# Patient Record
Sex: FEMALE | Race: WHITE | NOT HISPANIC OR LATINO | Employment: FULL TIME | URBAN - METROPOLITAN AREA
[De-identification: names, ages, dates, MRNs, and addresses within clinical notes are randomized per-mention and may not be internally consistent; named-entity substitution may affect disease eponyms.]

---

## 2022-11-01 ENCOUNTER — PRE VISIT (OUTPATIENT)
Dept: ONCOLOGY | Facility: CLINIC | Age: 57
End: 2022-11-01

## 2022-11-01 ENCOUNTER — TRANSCRIBE ORDERS (OUTPATIENT)
Dept: ONCOLOGY | Facility: CLINIC | Age: 57
End: 2022-11-01

## 2022-11-01 ENCOUNTER — TRANSFERRED RECORDS (OUTPATIENT)
Dept: HEALTH INFORMATION MANAGEMENT | Facility: CLINIC | Age: 57
End: 2022-11-01

## 2022-11-01 ENCOUNTER — PATIENT OUTREACH (OUTPATIENT)
Dept: ONCOLOGY | Facility: CLINIC | Age: 57
End: 2022-11-01

## 2022-11-01 DIAGNOSIS — C69.30 CHOROIDAL MALIGNANT MELANOMA (H): Primary | ICD-10-CM

## 2022-11-01 DIAGNOSIS — C69.30 MALIGNANT MELANOMA OF CHOROID, UNSPECIFIED LATERALITY (H): Primary | ICD-10-CM

## 2022-11-01 LAB — RETINOPATHY: NEGATIVE

## 2022-11-01 NOTE — PROGRESS NOTES
New Patient Oncology Nurse Navigator Note     Referring provider: Dr. Irwin Monroy     Referring Clinic/Organization: [facility]  [facility]     Referred to (specialty): Medical Oncology    Requested provider (if applicable): Dr. 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 Irwin Monroy's office note dated  11/1/2022.     Clinical Assessment / Barriers to Care (Per Nurse): none noted     Records Location (Care Everywhere, Media, etc.): Epic, CE (     Records Needed: none     Additional testing needed prior to consult:   CT c/a/p and Labs (cbc w/diff & CMP):  scheduled at

## 2022-11-01 NOTE — PROGRESS NOTES
RECORDS STATUS - ALL OTHER DIAGNOSIS    Choroidal malignant melanoma (H)   RECORDS RECEIVED FROM: [facility]/ [facility] [phone]     DATE RECEIVED: 12/12/2022    Action    Action Taken 11/1/2022 3:26pm SAVANNA   I called [facility] [phone]- they will fax records over.     11/2/2022 11:18AM SAVANNA   I faxed records from [facility] to [facility] and emailed recs to the nurse nati team.     I called pt Cheyenne - unavailable. I tried calling again - no luck.       NOTES STATUS DETAILS   OFFICE NOTE from referring provider Complete Irwin Mehta MD   OFFICE NOTE from medical oncologist Complete I faxed records from [facility] to [facility] on 11/2/2022 11:23AM SAVANNA   LABS     PATHOLOGY REPORTS     ANYTHING RELATED TO DIAGNOSIS Complete Labs last updated on 11/1/2022   GENONOMIC TESTING     TYPE:     IMAGING (NEED IMAGES & REPORT)     CT SCANS Complete CT Chest Abdomen Pelvis 11/14/2022   MRI     MAMMO     ULTRASOUND     PET

## 2022-11-14 ENCOUNTER — LAB (OUTPATIENT)
Dept: LAB | Facility: CLINIC | Age: 57
End: 2022-11-14
Attending: INTERNAL MEDICINE
Payer: COMMERCIAL

## 2022-11-14 ENCOUNTER — HOSPITAL ENCOUNTER (OUTPATIENT)
Dept: CT IMAGING | Facility: CLINIC | Age: 57
Discharge: HOME | End: 2022-11-14
Attending: INTERNAL MEDICINE
Payer: COMMERCIAL

## 2022-11-14 DIAGNOSIS — C69.30 MALIGNANT MELANOMA OF CHOROID, UNSPECIFIED LATERALITY (H): ICD-10-CM

## 2022-11-14 LAB
ALBUMIN SERPL-MCNC: 3.8 G/DL (ref 3.4–5)
ALP SERPL-CCNC: 120 U/L (ref 40–150)
ALT SERPL W P-5'-P-CCNC: 57 U/L (ref 0–50)
ANION GAP SERPL CALCULATED.3IONS-SCNC: 8 MMOL/L (ref 3–14)
AST SERPL W P-5'-P-CCNC: 22 U/L (ref 0–45)
BASOPHILS # BLD AUTO: 0 10E3/UL (ref 0–0.2)
BASOPHILS NFR BLD AUTO: 0 %
BILIRUB SERPL-MCNC: 0.5 MG/DL (ref 0.2–1.3)
BUN SERPL-MCNC: 18 MG/DL (ref 7–30)
CALCIUM SERPL-MCNC: 9.1 MG/DL (ref 8.5–10.1)
CHLORIDE BLD-SCNC: 100 MMOL/L (ref 94–109)
CO2 SERPL-SCNC: 24 MMOL/L (ref 20–32)
CREAT SERPL-MCNC: 0.66 MG/DL (ref 0.52–1.04)
EOSINOPHIL # BLD AUTO: 0.1 10E3/UL (ref 0–0.7)
EOSINOPHIL NFR BLD AUTO: 1 %
ERYTHROCYTE [DISTWIDTH] IN BLOOD BY AUTOMATED COUNT: 11.9 % (ref 10–15)
GFR SERPL CREATININE-BSD FRML MDRD: >90 ML/MIN/1.73M2
GLUCOSE BLD-MCNC: 197 MG/DL (ref 70–99)
HCT VFR BLD AUTO: 39.4 % (ref 35–47)
HGB BLD-MCNC: 13.8 G/DL (ref 11.7–15.7)
IMM GRANULOCYTES # BLD: 0 10E3/UL
IMM GRANULOCYTES NFR BLD: 0 %
LYMPHOCYTES # BLD AUTO: 2.5 10E3/UL (ref 0.8–5.3)
LYMPHOCYTES NFR BLD AUTO: 38 %
MCH RBC QN AUTO: 29.2 PG (ref 26.5–33)
MCHC RBC AUTO-ENTMCNC: 35 G/DL (ref 31.5–36.5)
MCV RBC AUTO: 83 FL (ref 78–100)
MONOCYTES # BLD AUTO: 0.4 10E3/UL (ref 0–1.3)
MONOCYTES NFR BLD AUTO: 6 %
NEUTROPHILS # BLD AUTO: 3.6 10E3/UL (ref 1.6–8.3)
NEUTROPHILS NFR BLD AUTO: 55 %
NRBC # BLD AUTO: 0 10E3/UL
NRBC BLD AUTO-RTO: 0 /100
PLATELET # BLD AUTO: 236 10E3/UL (ref 150–450)
POTASSIUM BLD-SCNC: 4.3 MMOL/L (ref 3.4–5.3)
PROT SERPL-MCNC: 7.5 G/DL (ref 6.8–8.8)
RBC # BLD AUTO: 4.73 10E6/UL (ref 3.8–5.2)
SODIUM SERPL-SCNC: 132 MMOL/L (ref 133–144)
WBC # BLD AUTO: 6.7 10E3/UL (ref 4–11)

## 2022-11-14 PROCEDURE — 80053 COMPREHEN METABOLIC PANEL: CPT

## 2022-11-14 PROCEDURE — 85004 AUTOMATED DIFF WBC COUNT: CPT

## 2022-11-14 PROCEDURE — 250N000011 HC RX IP 250 OP 636: Performed by: INTERNAL MEDICINE

## 2022-11-14 PROCEDURE — 250N000009 HC RX 250: Performed by: INTERNAL MEDICINE

## 2022-11-14 PROCEDURE — 82040 ASSAY OF SERUM ALBUMIN: CPT

## 2022-11-14 PROCEDURE — 36415 COLL VENOUS BLD VENIPUNCTURE: CPT

## 2022-11-14 PROCEDURE — 74177 CT ABD & PELVIS W/CONTRAST: CPT

## 2022-11-14 RX ORDER — IOPAMIDOL 755 MG/ML
101 INJECTION, SOLUTION INTRAVASCULAR ONCE
Status: COMPLETED | OUTPATIENT
Start: 2022-11-14 | End: 2022-11-14

## 2022-11-14 RX ADMIN — IOPAMIDOL 101 ML: 755 INJECTION, SOLUTION INTRAVENOUS at 08:19

## 2022-11-14 RX ADMIN — SODIUM CHLORIDE 69 ML: 9 INJECTION, SOLUTION INTRAVENOUS at 08:19

## 2022-11-18 ENCOUNTER — VIRTUAL VISIT (OUTPATIENT)
Dept: ONCOLOGY | Facility: CLINIC | Age: 57
End: 2022-11-18
Attending: OPHTHALMOLOGY
Payer: COMMERCIAL

## 2022-11-18 DIAGNOSIS — C69.31 MALIGNANT MELANOMA OF CHOROID OF RIGHT EYE (H): ICD-10-CM

## 2022-11-18 PROBLEM — E11.9 TYPE 2 DIABETES MELLITUS WITHOUT COMPLICATION, WITHOUT LONG-TERM CURRENT USE OF INSULIN (H): Status: ACTIVE | Noted: 2017-09-18

## 2022-11-18 PROCEDURE — 99204 OFFICE O/P NEW MOD 45 MIN: CPT | Mod: 95 | Performed by: INTERNAL MEDICINE

## 2022-11-18 PROCEDURE — G0463 HOSPITAL OUTPT CLINIC VISIT: HCPCS | Mod: PN,GT | Performed by: INTERNAL MEDICINE

## 2022-11-18 RX ORDER — BLOOD SUGAR DIAGNOSTIC
STRIP MISCELLANEOUS
COMMUNITY
Start: 2021-11-16

## 2022-11-18 RX ORDER — DULAGLUTIDE 1.5 MG/.5ML
INJECTION, SOLUTION SUBCUTANEOUS
COMMUNITY
Start: 2022-08-20

## 2022-11-18 RX ORDER — LOSARTAN POTASSIUM AND HYDROCHLOROTHIAZIDE 12.5; 1 MG/1; MG/1
1 TABLET ORAL DAILY
COMMUNITY
Start: 2022-09-20

## 2022-11-18 RX ORDER — ESCITALOPRAM OXALATE 10 MG/1
TABLET ORAL
COMMUNITY
Start: 2022-10-08

## 2022-11-18 RX ORDER — METFORMIN HCL 500 MG
2000 TABLET, EXTENDED RELEASE 24 HR ORAL
COMMUNITY
Start: 2022-10-08

## 2022-11-18 RX ORDER — METOPROLOL SUCCINATE 50 MG/1
50 TABLET, EXTENDED RELEASE ORAL DAILY
COMMUNITY
Start: 2022-10-09

## 2022-11-18 RX ORDER — ATORVASTATIN CALCIUM 20 MG/1
20 TABLET, FILM COATED ORAL DAILY
COMMUNITY
Start: 2022-10-08

## 2022-11-18 NOTE — LETTER
11/18/2022         RE: Cheyenne Caldwell  [address]  [place] [facility] [address]        Dear Colleague,    Thank you for referring your patient, Cheyenne Caldwell, to the [facility]. Please see a copy of my visit note below.    MEDICAL ONCOLOGY CONSULT  Melanoma Clinic  Nov 18, 2022      ASSESSMENT/PLAN:    #1 Choroidal melanoma, right eye, (T1a) Stage I    It was a pleasure to meet Mrs. Amelia Caldwell. 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 She had CT-CAP, which was negative for evidence of metastatic disease.    We discussed there is risk of later development of metastasis in 40-50% patients diagnosed with choroidal melanoma. We discussed that size of the lesion influences the risk of micrometastasis. Given the size of the lesion, she would be be considered to have a small, or T1a, tumor. For small choroidal melanomas (<3 mm tumor thickness) a study of 8,033 uveal melanoma patients by Davin et al. showed Osuna-Checo estimates of metastasis at 5, 10, and 20 years were 6%, 12%, and 20%. We briefly discussed the use of biopsy for gene expression testing, which would provide additional risk level information based on the biological behavior of the tumor cells. Patient will take under advisement and review further with Dr. Monroy.     We reviewed my role is to assist with monitoring and surveillance given the potential risks. For now I would recommend we continue with a plan for CT-CAP based surveillance imaging and plan a return visit with scans and labs in another 3 months. She agrees to proceed and has follow-up planned with Dr. Monroy to review local therapies in greater detail.    -Genetic counseling referral  -RTC in 3 months with Ct-CAP and labs    Chuy Trevino M.D.   of Medicine  Hematology, Oncology and Transplantation  Pager:  [phone]    ------------------------------------------------------------    Chief Complaint: Right eye choroidal melanoma    History of Present Illness:  Cheyenne Caldwell is a 57 year old female with diabetes type 2 and hypertension, and a recent diagnosis of choroidal melanoma of the right eye. She presents via video visit, referred by Dr. Monroy.    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 Given the usual pace of life she was at first generally unaware of any major visual changes.  However, she notes she no longer can use an old glassessprescription she keeps at work. Initially she had no major blurry vision, and perhaps some minor visual change noticeable only when laying on her left side with left eye obstructed view through the right eye. No flashes or floaters.     Patient was initially 8/2/22: Choroidal Lesion 0.77mmH x 4.37 mmL x 4.50 mmW.    Worsening compared to prior study. 11/1/22: Choroidal Lesion 1.87 mmH x 5.86 mmL x 6.60 mmW.    Over time she has noticed a blurry spot develop nasally in the right eye. She denies eye pain, but has had some headaches from straining. She has noticed these changes in retrospect for about the last 3-4 months.    She had CT-CAP, which was negative for evidence of metastatic disease. Next appointment is on 12/5/22 with Dr. Monroy.    Review of Systems:  A 12-point ROS negative except as in HPI    Current Outpatient Medications   Medication Sig Dispense Refill     atorvastatin (LIPITOR) 20 MG tablet Take 20 mg by mouth daily       blood glucose (ONETOUCH VERIO IQ) test strip Use  to test daily.       escitalopram (LEXAPRO) 10 MG tablet TAKE 1 AND 1/2 TABLETS BY MOUTH DAILY       losartan-hydrochlorothiazide (HYZAAR) 100-12.5 MG tablet Take 1 tablet by mouth daily       metFORMIN (GLUCOPHAGE XR) 500 MG 24 hr tablet Take 2,000 mg by mouth daily (with breakfast)       metoprolol succinate ER (TOPROL XL) 50 MG 24 hr  tablet Take 50 mg by mouth daily       TRULICITY 1.5 MG/0.5ML pen ADMINISTER 1.5 MG UNDER THE SKIN 1 TIME A WEEK         Allergies   Allergen Reactions     Penicillins Other (See Comments) and Unknown     swelling       Immunization History   Administered Date(s) Administered     COVID-19,PF,Pfizer (12+ Yrs) 03/29/2021, 04/19/2021, 11/30/2021     FLU 6-35 months 01/29/2007     Flu, Unspecified 01/29/2007     HepB-Adult 12/15/2017, 11/20/2018, 09/10/2019     Influenza Vaccine IM > 6 months Valent IIV4 (Alfuria,Fluzone) 10/16/2013, 10/23/2014, 01/22/2016, 09/18/2017, 11/20/2018, 09/10/2019, 11/30/2021     Influenza Vaccine IM Ages 6-35 Months 4 Valent (PF) 10/16/2013     Pneumococcal 23 valent 09/18/2017     Tdap (Adacel,Boostrix) 01/29/2007, 05/12/2017     Zoster vaccine recombinant adjuvanted (SHINGRIX) 09/10/2019, 12/09/2019       Past Medical History:   Diagnosis Date     Benign essential hypertension      Diabetes mellitus, type 2 (H)        No past surgical history on file.    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 Three children and 2 grandchildren. No smoking.   History   Smoking Status     Not on file   Smokeless Tobacco     Not on file    Social History    Substance and Sexual Activity      Alcohol use: Not on file     History   Drug Use Not on file       Family History:  Breast cancer, colon cancer, cervical cancer: mother  Breast cancer: maternal grandmother    Physical Examination:  There were no vitals taken for this visit.  Wt Readings from Last 5 Encounters:   No data found for Wt     GENERAL: Healthy, alert and no distress  EYES: Eyes grossly normal to inspection.  No discharge or erythema, or obvious scleral/conjunctival abnormalities.  HENT: Normal cephalic/atraumatic.  External ears, nose and mouth without ulcers or lesions.  No nasal drainage visible.  NECK: No asymmetry, visible masses or scars  RESP: No audible wheeze, cough, or visible  cyanosis.  No visible retractions or increased work of breathing.    SKIN: Visible skin clear. No significant rash, abnormal pigmentation or lesions.  NEURO: Cranial nerves grossly intact.  Mentation and speech appropriate for age.  PSYCH: Mentation appears normal, affect normal/bright, judgement and insight intact, normal speech and appearance well-groomed.    The rest of a comprehensive physical examination is deferred due to PHE (public health emergency) video visit restrictions.    Laboratory Data:  Lab on 11/14/2022   Component Date Value Ref Range Status     Sodium 11/14/2022 132 (L)  133 - 144 mmol/L Final     Potassium 11/14/2022 4.3  3.4 - 5.3 mmol/L Final     Chloride 11/14/2022 100  94 - 109 mmol/L Final     Carbon Dioxide (CO2) 11/14/2022 24  20 - 32 mmol/L Final     Anion Gap 11/14/2022 8  3 - 14 mmol/L Final     Urea Nitrogen 11/14/2022 18  7 - 30 mg/dL Final     Creatinine 11/14/2022 0.66  0.52 - 1.04 mg/dL Final     Calcium 11/14/2022 9.1  8.5 - 10.1 mg/dL Final     Glucose 11/14/2022 197 (H)  70 - 99 mg/dL Final     Alkaline Phosphatase 11/14/2022 120  40 - 150 U/L Final     AST 11/14/2022 22  0 - 45 U/L Final     ALT 11/14/2022 57 (H)  0 - 50 U/L Final     Protein Total 11/14/2022 7.5  6.8 - 8.8 g/dL Final     Albumin 11/14/2022 3.8  3.4 - 5.0 g/dL Final     Bilirubin Total 11/14/2022 0.5  0.2 - 1.3 mg/dL Final     GFR Estimate 11/14/2022 >90  >60 mL/min/1.73m2 Final     WBC Count 11/14/2022 6.7  4.0 - 11.0 10e3/uL Final     RBC Count 11/14/2022 4.73  3.80 - 5.20 10e6/uL Final     Hemoglobin 11/14/2022 13.8  11.7 - 15.7 g/dL Final     Hematocrit 11/14/2022 39.4  35.0 - 47.0 % Final     MCV 11/14/2022 83  78 - 100 fL Final     MCH 11/14/2022 29.2  26.5 - 33.0 pg Final     MCHC 11/14/2022 35.0  31.5 - 36.5 g/dL Final     RDW 11/14/2022 11.9  10.0 - 15.0 % Final     Platelet Count 11/14/2022 236  150 - 450 10e3/uL Final     % Neutrophils 11/14/2022 55  % Final     % Lymphocytes 11/14/2022 38  % Final      % Monocytes 11/14/2022 6  % Final     % Eosinophils 11/14/2022 1  % Final     % Basophils 11/14/2022 0  % Final     % Immature Granulocytes 11/14/2022 0  % Final     NRBCs per 100 WBC 11/14/2022 0  <1 /100 Final     Absolute Neutrophils 11/14/2022 3.6  1.6 - 8.3 10e3/uL Final     Absolute Lymphocytes 11/14/2022 2.5  0.8 - 5.3 10e3/uL Final     Absolute Monocytes 11/14/2022 0.4  0.0 - 1.3 10e3/uL Final     Absolute Eosinophils 11/14/2022 0.1  0.0 - 0.7 10e3/uL Final     Absolute Basophils 11/14/2022 0.0  0.0 - 0.2 10e3/uL Final     Absolute Immature Granulocytes 11/14/2022 0.0  <=0.4 10e3/uL Final     Absolute NRBCs 11/14/2022 0.0  10e3/uL Final   Transferred Records on 11/01/2022   Component Date Value Ref Range Status     RETINOPATHY 11/01/2022 NEGATIVE   Final       Imaging Studies:  CT Chest/Abdomen/Pelvis w Contrast  Narrative: CT CHEST/ABDOMEN/PELVIS WITH CONTRAST  11/14/2022 8:34 AM    CLINICAL HISTORY: Malignant melanoma of choroid, unspecified  laterality (H).    TECHNIQUE: CT scan of the chest, abdomen, and pelvis was performed  following injection of IV contrast. Multiplanar reformats were  obtained. Dose reduction techniques were used.   CONTRAST: 101 mL Isovue-370    COMPARISON: Limited chest CT from February 3, 2015.    FINDINGS:   LUNGS AND PLEURA: Lungs are clear. No pleural effusions.    MEDIASTINUM/AXILLAE: No lymphadenopathy. No thoracic aortic aneurysms.    CORONARY ARTERY CALCIFICATIONS: None.    HEPATOBILIARY: Diffuse hepatic steatosis. No significant mass. No bile  duct dilatation. No calcified gallstones.    PANCREAS: No significant mass, duct dilatation, or inflammatory  change.    SPLEEN: Normal size.    ADRENAL GLANDS: No significant nodules.    KIDNEYS/BLADDER: No significant mass, stones, or hydronephrosis.    BOWEL: No obstruction or inflammatory change.    PELVIC ORGANS: No pelvic masses.    ADDITIONAL FINDINGS: No ascites.    MUSCULOSKELETAL: No frankly destructive bony  lesions.  Impression: IMPRESSION:  No recurrent or residual malignancy demonstrated.    GREGORIO SIMMONS MD         SYSTEM ID:  [id number]        Sincerely,    Chuy Spencer MD

## 2022-11-18 NOTE — PROGRESS NOTES
Amelia is a 57 year old who is being evaluated via a billable video visit.      How would you like to obtain your AVS? MyChart  If the video visit is dropped, the invitation should be resent by: Text to cell phone: [phone]  Will anyone else be joining your video visit? Yes: Alfred Junior Jake. How would they like to receive their invitation? Other e-mail: in person        Video-Visit Details    Video Start Time: 1:00 PM    Type of service:  Video Visit    Video End Time:1:35 PM    Originating Location (pt. Location): Home        Distant Location (provider location):  On-site    Platform used for Video Visit: Sharon [place]

## 2022-11-18 NOTE — PROGRESS NOTES
MEDICAL ONCOLOGY CONSULT  Melanoma Clinic  Nov 18, 2022      ASSESSMENT/PLAN:    #1 Choroidal melanoma, right eye, (T1a) Stage I    It was a pleasure to meet Mrs. Amelia Caldwell. 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 She had CT-CAP, which was negative for evidence of metastatic disease.    We discussed there is risk of later development of metastasis in 40-50% patients diagnosed with choroidal melanoma. We discussed that size of the lesion influences the risk of micrometastasis. Given the size of the lesion, she would be be considered to have a small, or T1a, tumor. For small choroidal melanomas (<3 mm tumor thickness) a study of 8,033 uveal melanoma patients by Davin et al. showed Osuna-Checo estimates of metastasis at 5, 10, and 20 years were 6%, 12%, and 20%. We briefly discussed the use of biopsy for gene expression testing, which would provide additional risk level information based on the biological behavior of the tumor cells. Patient will take under advisement and review further with Dr. Monroy.     We reviewed my role is to assist with monitoring and surveillance given the potential risks. For now I would recommend we continue with a plan for CT-CAP based surveillance imaging and plan a return visit with scans and labs in another 3 months. She agrees to proceed and has follow-up planned with Dr. Monroy to review local therapies in greater detail.    -Genetic counseling referral  -RTC in 3 months with Ct-CAP and labs    Chuy Trevino M.D.   of Medicine  Hematology, Oncology and Transplantation  Pager: [phone]    ------------------------------------------------------------    Chief Complaint: Right eye choroidal melanoma    History of Present Illness:  Cheyenne Caldwell is a 57 year old female with diabetes type 2 and hypertension, and a recent diagnosis of choroidal melanoma of the right eye. She presents via video visit, referred  by Dr. Monroy.    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 Given the usual pace of life she was at first generally unaware of any major visual changes.  However, she notes she no longer can use an old glassessprescription she keeps at work. Initially she had no major blurry vision, and perhaps some minor visual change noticeable only when laying on her left side with left eye obstructed view through the right eye. No flashes or floaters.     Patient was initially 8/2/22: Choroidal Lesion 0.77mmH x 4.37 mmL x 4.50 mmW.    Worsening compared to prior study. 11/1/22: Choroidal Lesion 1.87 mmH x 5.86 mmL x 6.60 mmW.    Over time she has noticed a blurry spot develop nasally in the right eye. She denies eye pain, but has had some headaches from straining. She has noticed these changes in retrospect for about the last 3-4 months.    She had CT-CAP, which was negative for evidence of metastatic disease. Next appointment is on 12/5/22 with Dr. Monroy.    Review of Systems:  A 12-point ROS negative except as in HPI    Current Outpatient Medications   Medication Sig Dispense Refill     atorvastatin (LIPITOR) 20 MG tablet Take 20 mg by mouth daily       blood glucose (ONETOUCH VERIO IQ) test strip Use  to test daily.       escitalopram (LEXAPRO) 10 MG tablet TAKE 1 AND 1/2 TABLETS BY MOUTH DAILY       losartan-hydrochlorothiazide (HYZAAR) 100-12.5 MG tablet Take 1 tablet by mouth daily       metFORMIN (GLUCOPHAGE XR) 500 MG 24 hr tablet Take 2,000 mg by mouth daily (with breakfast)       metoprolol succinate ER (TOPROL XL) 50 MG 24 hr tablet Take 50 mg by mouth daily       TRULICITY 1.5 MG/0.5ML pen ADMINISTER 1.5 MG UNDER THE SKIN 1 TIME A WEEK         Allergies   Allergen Reactions     Penicillins Other (See Comments) and Unknown     swelling       Immunization History   Administered Date(s) Administered     COVID-MALIKA Melendrez,Pfizer (12+ Yrs) 03/29/2021, 04/19/2021,  11/30/2021     FLU 6-35 months 01/29/2007     Flu, Unspecified 01/29/2007     HepB-Adult 12/15/2017, 11/20/2018, 09/10/2019     Influenza Vaccine IM > 6 months Valent IIV4 (Alfuria,Fluzone) 10/16/2013, 10/23/2014, 01/22/2016, 09/18/2017, 11/20/2018, 09/10/2019, 11/30/2021     Influenza Vaccine IM Ages 6-35 Months 4 Valent (PF) 10/16/2013     Pneumococcal 23 valent 09/18/2017     Tdap (Adacel,Boostrix) 01/29/2007, 05/12/2017     Zoster vaccine recombinant adjuvanted (SHINGRIX) 09/10/2019, 12/09/2019       Past Medical History:   Diagnosis Date     Benign essential hypertension      Diabetes mellitus, type 2 (H)        No past surgical history on file.    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 Three children and 2 grandchildren. No smoking.   History   Smoking Status     Not on file   Smokeless Tobacco     Not on file    Social History    Substance and Sexual Activity      Alcohol use: Not on file     History   Drug Use Not on file       Family History:  Breast cancer, colon cancer, cervical cancer: mother  Breast cancer: maternal grandmother    Physical Examination:  There were no vitals taken for this visit.  Wt Readings from Last 5 Encounters:   No data found for Wt     GENERAL: Healthy, alert and no distress  EYES: Eyes grossly normal to inspection.  No discharge or erythema, or obvious scleral/conjunctival abnormalities.  HENT: Normal cephalic/atraumatic.  External ears, nose and mouth without ulcers or lesions.  No nasal drainage visible.  NECK: No asymmetry, visible masses or scars  RESP: No audible wheeze, cough, or visible cyanosis.  No visible retractions or increased work of breathing.    SKIN: Visible skin clear. No significant rash, abnormal pigmentation or lesions.  NEURO: Cranial nerves grossly intact.  Mentation and speech appropriate for age.  PSYCH: Mentation appears normal, affect normal/bright, judgement and insight intact, normal speech and  appearance well-groomed.    The rest of a comprehensive physical examination is deferred due to PHE (public health emergency) video visit restrictions.    Laboratory Data:  Lab on 11/14/2022   Component Date Value Ref Range Status     Sodium 11/14/2022 132 (L)  133 - 144 mmol/L Final     Potassium 11/14/2022 4.3  3.4 - 5.3 mmol/L Final     Chloride 11/14/2022 100  94 - 109 mmol/L Final     Carbon Dioxide (CO2) 11/14/2022 24  20 - 32 mmol/L Final     Anion Gap 11/14/2022 8  3 - 14 mmol/L Final     Urea Nitrogen 11/14/2022 18  7 - 30 mg/dL Final     Creatinine 11/14/2022 0.66  0.52 - 1.04 mg/dL Final     Calcium 11/14/2022 9.1  8.5 - 10.1 mg/dL Final     Glucose 11/14/2022 197 (H)  70 - 99 mg/dL Final     Alkaline Phosphatase 11/14/2022 120  40 - 150 U/L Final     AST 11/14/2022 22  0 - 45 U/L Final     ALT 11/14/2022 57 (H)  0 - 50 U/L Final     Protein Total 11/14/2022 7.5  6.8 - 8.8 g/dL Final     Albumin 11/14/2022 3.8  3.4 - 5.0 g/dL Final     Bilirubin Total 11/14/2022 0.5  0.2 - 1.3 mg/dL Final     GFR Estimate 11/14/2022 >90  >60 mL/min/1.73m2 Final     WBC Count 11/14/2022 6.7  4.0 - 11.0 10e3/uL Final     RBC Count 11/14/2022 4.73  3.80 - 5.20 10e6/uL Final     Hemoglobin 11/14/2022 13.8  11.7 - 15.7 g/dL Final     Hematocrit 11/14/2022 39.4  35.0 - 47.0 % Final     MCV 11/14/2022 83  78 - 100 fL Final     MCH 11/14/2022 29.2  26.5 - 33.0 pg Final     MCHC 11/14/2022 35.0  31.5 - 36.5 g/dL Final     RDW 11/14/2022 11.9  10.0 - 15.0 % Final     Platelet Count 11/14/2022 236  150 - 450 10e3/uL Final     % Neutrophils 11/14/2022 55  % Final     % Lymphocytes 11/14/2022 38  % Final     % Monocytes 11/14/2022 6  % Final     % Eosinophils 11/14/2022 1  % Final     % Basophils 11/14/2022 0  % Final     % Immature Granulocytes 11/14/2022 0  % Final     NRBCs per 100 WBC 11/14/2022 0  <1 /100 Final     Absolute Neutrophils 11/14/2022 3.6  1.6 - 8.3 10e3/uL Final     Absolute Lymphocytes 11/14/2022 2.5  0.8 - 5.3  10e3/uL Final     Absolute Monocytes 11/14/2022 0.4  0.0 - 1.3 10e3/uL Final     Absolute Eosinophils 11/14/2022 0.1  0.0 - 0.7 10e3/uL Final     Absolute Basophils 11/14/2022 0.0  0.0 - 0.2 10e3/uL Final     Absolute Immature Granulocytes 11/14/2022 0.0  <=0.4 10e3/uL Final     Absolute NRBCs 11/14/2022 0.0  10e3/uL Final   Transferred Records on 11/01/2022   Component Date Value Ref Range Status     RETINOPATHY 11/01/2022 NEGATIVE   Final       Imaging Studies:  CT Chest/Abdomen/Pelvis w Contrast  Narrative: CT CHEST/ABDOMEN/PELVIS WITH CONTRAST  11/14/2022 8:34 AM    CLINICAL HISTORY: Malignant melanoma of choroid, unspecified  laterality (H).    TECHNIQUE: CT scan of the chest, abdomen, and pelvis was performed  following injection of IV contrast. Multiplanar reformats were  obtained. Dose reduction techniques were used.   CONTRAST: 101 mL Isovue-370    COMPARISON: Limited chest CT from February 3, 2015.    FINDINGS:   LUNGS AND PLEURA: Lungs are clear. No pleural effusions.    MEDIASTINUM/AXILLAE: No lymphadenopathy. No thoracic aortic aneurysms.    CORONARY ARTERY CALCIFICATIONS: None.    HEPATOBILIARY: Diffuse hepatic steatosis. No significant mass. No bile  duct dilatation. No calcified gallstones.    PANCREAS: No significant mass, duct dilatation, or inflammatory  change.    SPLEEN: Normal size.    ADRENAL GLANDS: No significant nodules.    KIDNEYS/BLADDER: No significant mass, stones, or hydronephrosis.    BOWEL: No obstruction or inflammatory change.    PELVIC ORGANS: No pelvic masses.    ADDITIONAL FINDINGS: No ascites.    MUSCULOSKELETAL: No frankly destructive bony lesions.  Impression: IMPRESSION:  No recurrent or residual malignancy demonstrated.    GREGORIO SIMMONS MD         SYSTEM ID:  [id number]

## 2022-12-06 ENCOUNTER — TRANSFERRED RECORDS (OUTPATIENT)
Dept: HEALTH INFORMATION MANAGEMENT | Facility: CLINIC | Age: 57
End: 2022-12-06

## 2022-12-06 ENCOUNTER — TRANSCRIBE ORDERS (OUTPATIENT)
Dept: RADIATION ONCOLOGY | Facility: CLINIC | Age: 57
End: 2022-12-06

## 2022-12-06 DIAGNOSIS — C69.30 CHOROIDAL MALIGNANT MELANOMA (H): Primary | ICD-10-CM

## 2022-12-08 NOTE — PROGRESS NOTES
MEDICAL RECORDS REQUEST   Radiation Oncology  [address] [place] [address]  PHONE: [phone]  Fax: [phone]        FUTURE VISIT INFORMATION                                                   Cheyenne K Paulette, : 1965 scheduled for future visit at [facility] Radiation Oncology    APPOINTMENT INFORMATION:    Date: 2022    Provider: Dr. Hector     Reason for Visit/Diagnosis: Choroidal malignant melanoma (H),    REFERRAL INFORMATION:    Referring provider:  Irwin Monroy MD  RECORDS REQUESTED FOR VISIT                                                     Action    Action Taken 2022 2:38pm SAVANNA     I called pt Amelia - franko.      Choroidal malignant melanoma (H)    CT, MRI, PET/CT AND REPORTS yes     CT Chest Abdomen Pelvis 2022   ANY RECENT LABS yes   CHEMO & MEDICAL ONCOLOGY NOTES yes - in EPIC   CURRENT MEDICATION LIST yes   *Send all radiation Prior radiation records for both [facility] and [facility] Radiation Oncology patients to [facility] with  ATTN: SHAKIRA/Bradley Dosi/Physics

## 2022-12-10 ENCOUNTER — HEALTH MAINTENANCE LETTER (OUTPATIENT)
Age: 57
End: 2022-12-10

## 2022-12-12 ENCOUNTER — OFFICE VISIT (OUTPATIENT)
Dept: RADIATION ONCOLOGY | Facility: CLINIC | Age: 57
End: 2022-12-12
Attending: OPHTHALMOLOGY
Payer: COMMERCIAL

## 2022-12-12 ENCOUNTER — PRE VISIT (OUTPATIENT)
Dept: RADIATION ONCOLOGY | Facility: CLINIC | Age: 57
End: 2022-12-12

## 2022-12-12 VITALS
DIASTOLIC BLOOD PRESSURE: 89 MMHG | OXYGEN SATURATION: 96 % | RESPIRATION RATE: 16 BRPM | HEART RATE: 77 BPM | WEIGHT: 227.2 LBS | SYSTOLIC BLOOD PRESSURE: 142 MMHG

## 2022-12-12 DIAGNOSIS — C69.30 CHOROIDAL MALIGNANT MELANOMA (H): ICD-10-CM

## 2022-12-12 PROCEDURE — G0463 HOSPITAL OUTPT CLINIC VISIT: HCPCS | Performed by: RADIOLOGY

## 2022-12-12 PROCEDURE — 99204 OFFICE O/P NEW MOD 45 MIN: CPT | Mod: GC | Performed by: RADIOLOGY

## 2022-12-12 ASSESSMENT — PAIN SCALES - GENERAL: PAINLEVEL: NO PAIN (0)

## 2022-12-12 NOTE — PROGRESS NOTES
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 She presents to radiation oncology clinic for evaluation and consultation for treatment.      Patient was initially seen on 22 with Dr. Irwin Monroy. She had a 6 months history of a blurry spot developing nasally in the right eye. She denied eye pain, but had some headaches from eye straining. On exam Dr Monroy noticed an elevated choroidal lesion with lipofuscin with subretinal fluid tracking inferiorly. Choroidal Lesion measured at 0.77mmH x 4.37 mmL x 4.50 mmW. She was observed with close followup in 3 months.      Followup exam showed worsening compared to prior study on 22: Choroidal Lesion 1.87 mmH x 5.86 mmL x 6.60 mmW.     She returned on 22. Color fundus photography and fundus autofluorescence remained unchanged. B scan follow up on 22 again showed dramatic interval increase in size to 1.99 mmH x 7.14 mmL x 7.42 mmW.     She had CT-CAP on 22, which was negative for evidence of metastatic disease.     She  endorses shadowing and blurring of her vision nasally in right eye as well as occasional headaches that she attributes to stress related to her diagnosis. She denies any pain or double vision to the right eye. Adds that these symptoms can require her to cover right eye while watching TV, and notes increased eye strain in the right eye at the end of the day if she spends significant time looking at screens.     She presents today with her daughter. She feels well. 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 Given the usual pace of life she was at first generally unaware of any major visual changes.  However, she notes she no longer can use an old glasses prescription she keeps at work. Initially she had no major blurry vision, and perhaps some minor visual change noticeable only when laying on her left side with left eye obstructed view through the right eye. No flashes or floaters.     She notes her mother  with breast, colon, and cervical cancer, and that her aunt also had cancer. She has an appointment to follow with genetic counseling.      PAST MEDICAL HISTORY     Past Medical History:   Diagnosis Date     Benign essential hypertension      Choroidal malignant melanoma, right (H)      Diabetes mellitus, type 2 (H)      CHEMOTHERAPY HISTORY:   No history of chemotherapy.     PAST RADIATION THERAPY HISTORY:   No history of prior radiation therapy     Electronic Implants: No    Pregnancy Status: Post menopausal, LMP 2021     SURGICAL HISTORY   No past surgical procedures.       CURRENT OUTPATIENT MEDICATIONS     Current Outpatient Medications   Medication Sig Dispense Refill     atorvastatin (LIPITOR) 20 MG tablet Take 20 mg by mouth daily       escitalopram (LEXAPRO) 10 MG tablet TAKE 1 AND 1/2 TABLETS BY MOUTH DAILY       losartan-hydrochlorothiazide (HYZAAR) 100-12.5 MG tablet Take 1 tablet by mouth daily       metFORMIN (GLUCOPHAGE XR) 500 MG 24  "hr tablet Take 2,000 mg by mouth daily (with breakfast)       metoprolol succinate ER (TOPROL XL) 50 MG 24 hr tablet Take 50 mg by mouth daily       TRULICITY 1.5 MG/0.5ML pen ADMINISTER 1.5 MG UNDER THE SKIN 1 TIME A WEEK       blood glucose (ONETOUCH VERIO IQ) test strip Use  to test daily.          ALLERGIES     Allergies   Allergen Reactions     Penicillins Other (See Comments) and Unknown     swelling          SOCIAL HISTORY     Social History     Tobacco Use     Smoking status: Never     Smokeless tobacco: Never   Vaping Use     Vaping Use: Never used   Substance Use Topics     Alcohol use: Not Currently     Comment: last use 2020     Drug use: Never   She lives with her  and dog. Reports that she spends time with her grandchildren. 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      FAMILY HISTORY   Younger cousin - \"eye freckle\" surgically removed, benign on pathology  Mother: Breast cancer, colon cancer, cervical cancer  Grandmother: Breast cancer  Brother: No health issues     REVIEW OF SYSTEMS   As above in the HPI     PHYSICAL EXAM     Wt Readings from Last 3 Encounters:   12/12/22 103.1 kg (227 lb 3.2 oz)     BP (!) 142/89   Pulse 77   Resp 16   Wt 103.1 kg (227 lb 3.2 oz)   LMP 11/01/2021 (Within Weeks)   SpO2 96%   GEN: NAD   HEENT: no icterus, injection or pallor  LUNGS: no respiratory distress  ABDOMEN: non-distended  EXT: well perfused, no edema  NEURO: alert  SKIN: no rashes     LABORATORY, PATHOLOGY AND IMAGING STUDIES   LABORATORY:   All laboratory data reviewed    PATHOLOGY: reviewed see HPI     IMAGING:   CT Chest Abdomen Pelvis with Contrast:  No recurrent or residual malignancy demonstrated.    Impression per reading radiologist.      ASSESSMENT AND PLAN   Amelia Caldwell is a 57 year old female with a past medical history of type II diabetes, essential hypertension presenting to radiation oncology for a choroidal melanoma of the right eye.    I recommended eyeplaque brachytherapy and discussed " treatment options including brachytherapy with eye plaque, gamma knife, and surgical interventions. I feel that with the location and nature of the choroidal melanoma that brachytherapy with eye plaque is most appropriate at this time. Discussed risks including permanent vision changes/loss in the right eye, high risk for cataract development, risk of glaucoma, and low risk secondary malignancy due to the radiation. The benefits of eye plaque include good rate of local control of disease, and avoidance of surgical removal of eye discussed as well.    This does not prevent  metastatic disease and regular surveillance for metastasis should be performed as indicated by Dr. Spencer. She is scheduled for repeat imaging in February 2023. I discussed results of CT CAP that did not show evidence of metastases at time of imaging on 11/14/22. All of the patient and her daughters' questions were answered. At this point, she would like to proceed with placement of eye plaque. Procedure was described to patient, as well as precautions including avoiding public places and sharing beds, maintaining distance from young and pregnant people while plaque is in place, use of radiopaque eye patch. Additionally, I do think biopsy and genetic sampling is reasonable in this patient with a family history of multiple cancers. Informed consent was obtained.     Plan:  1. Placement of eye plaque with Dr. Monroy on 1/30/23 with removal on 2/02/23        - anticipate intraoperative biopsy for genetic testing per patient, will defer decision on biopsy to Dr. Monroy   2. Continue to follow with Dr. Trevino of Medical Oncology and Dr. Monroy of Ophthalmology    Thank you for allowing us to participate in this patient's care.  Please feel free to call with any questions or concerns.    Benny Mancia, MS3  [employer]  12:14 PM 12/12/2022     Nick Aragon MD  PGY-2 Radiation Oncology  Department of Radiation  Oncology  [facility], [place]  Phone: [phone]     I was personally present with the resident during the history and exam.  I   discussed the case with the resident and agree with the findings and plan   of care as documented in the resident's note.    Monet Copeland   [phone]

## 2022-12-12 NOTE — PROGRESS NOTES
INITIAL PATIENT ASSESSMENT    Diagnosis: Choroidal melanoma, right eye    Prior radiation therapy: None    Prior chemotherapy: None    Prior hormonal therapy:No    Pain Eval:  Denies    Psychosocial  Living arrangements:   Fall Risk: independent   referral needs: Not needed    Advanced Directive: Yes - Location: at home, will bring in copy  Implantable Cardiac Device? No      LMP: about November 2021, post menopausal           Review of Systems   Eyes:        Shadowing in vision, right eye fatigue    All other systems reviewed and are negative.

## 2022-12-12 NOTE — LETTER
2022         RE: Cheyenne Caldwell  [address]  [facility] [address]        Dear Colleague,    Thank you for referring your patient, Cheyenne Caldwell, to the [facility] RADIATION ONCOLOGY. Please see a copy of my visit note below.      INITIAL PATIENT ASSESSMENT    Diagnosis: Choroidal melanoma, right eye    Prior radiation therapy: None    Prior chemotherapy: None    Prior hormonal therapy:No    Pain Eval:  Denies    Psychosocial  Living arrangements:   Fall Risk: independent   referral needs: Not needed    Advanced Directive: Yes - Location: at home, will bring in copy  Implantable Cardiac Device? No      LMP: about 2021, post menopausal           Review of Systems   Eyes:        Shadowing in vision, right eye fatigue    All other systems reviewed and are negative.                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 She presents to radiation oncology clinic for evaluation and consultation for  treatment.      Patient was initially seen on 22 with Dr. Irwin Monroy. She had a 6 months history of a blurry spot developing nasally in the right eye. She denied eye pain, but had some headaches from eye straining. On exam Dr Monroy noticed an elevated choroidal lesion with lipofuscin with subretinal fluid tracking inferiorly. Choroidal Lesion measured at 0.77mmH x 4.37 mmL x 4.50 mmW. She was observed with close followup in 3 months.      Followup exam showed worsening compared to prior study on 22: Choroidal Lesion 1.87 mmH x 5.86 mmL x 6.60 mmW.     She returned on 22. Color fundus photography and fundus autofluorescence remained unchanged. B scan follow up on 22 again showed dramatic interval increase in size to 1.99 mmH x 7.14 mmL x 7.42 mmW.     She had CT-CAP on 22, which was negative for evidence of metastatic disease.     She endorses shadowing and blurring of her vision nasally in right eye as well as occasional headaches that she attributes to stress related to her diagnosis. She denies any pain or double vision to the right eye. Adds that these symptoms can require her to cover right eye while watching TV, and notes increased eye strain in the right eye at the end of the day if she spends significant time looking at screens.     She presents today with her daughter. She feels well. 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 Given the usual pace of life she was at first generally unaware of any major visual changes.  However, she notes she no longer can use an old glasses prescription she keeps at work. Initially she had no major blurry vision, and perhaps some minor visual change noticeable only when laying on her left side with left eye obstructed view through the right eye. No flashes or floaters.     She notes her mother  with breast, colon, and cervical cancer, and that her aunt also had cancer. She has an  "appointment to follow with genetic counseling.      PAST MEDICAL HISTORY     Past Medical History:   Diagnosis Date     Benign essential hypertension      Choroidal malignant melanoma, right (H)      Diabetes mellitus, type 2 (H)      CHEMOTHERAPY HISTORY:   No history of chemotherapy.     PAST RADIATION THERAPY HISTORY:   No history of prior radiation therapy     Electronic Implants: No    Pregnancy Status: Post menopausal, LMP 11/2021     SURGICAL HISTORY   No past surgical procedures.       CURRENT OUTPATIENT MEDICATIONS     Current Outpatient Medications   Medication Sig Dispense Refill     atorvastatin (LIPITOR) 20 MG tablet Take 20 mg by mouth daily       escitalopram (LEXAPRO) 10 MG tablet TAKE 1 AND 1/2 TABLETS BY MOUTH DAILY       losartan-hydrochlorothiazide (HYZAAR) 100-12.5 MG tablet Take 1 tablet by mouth daily       metFORMIN (GLUCOPHAGE XR) 500 MG 24 hr tablet Take 2,000 mg by mouth daily (with breakfast)       metoprolol succinate ER (TOPROL XL) 50 MG 24 hr tablet Take 50 mg by mouth daily       TRULICITY 1.5 MG/0.5ML pen ADMINISTER 1.5 MG UNDER THE SKIN 1 TIME A WEEK       blood glucose (ONETOUCH VERIO IQ) test strip Use  to test daily.          ALLERGIES     Allergies   Allergen Reactions     Penicillins Other (See Comments) and Unknown     swelling          SOCIAL HISTORY     Social History     Tobacco Use     Smoking status: Never     Smokeless tobacco: Never   Vaping Use     Vaping Use: Never used   Substance Use Topics     Alcohol use: Not Currently     Comment: last use 2020     Drug use: Never   She lives with her  and dog. Reports that she spends time with her grandchildren. Works in [employer]. No smoking.      FAMILY HISTORY   Younger cousin - \"eye freckle\" surgically removed, benign on pathology  Mother: Breast cancer, colon cancer, cervical cancer  Grandmother: Breast cancer  Brother: No health issues     REVIEW OF SYSTEMS   As above in the HPI     PHYSICAL EXAM     Wt Readings " from Last 3 Encounters:   12/12/22 103.1 kg (227 lb 3.2 oz)     BP (!) 142/89   Pulse 77   Resp 16   Wt 103.1 kg (227 lb 3.2 oz)   LMP 11/01/2021 (Within Weeks)   SpO2 96%   GEN: NAD   HEENT: no icterus, injection or pallor  LUNGS: no respiratory distress  ABDOMEN: non-distended  EXT: well perfused, no edema  NEURO: alert  SKIN: no rashes     LABORATORY, PATHOLOGY AND IMAGING STUDIES   LABORATORY:   All laboratory data reviewed    PATHOLOGY: reviewed see HPI     IMAGING:   CT Chest Abdomen Pelvis with Contrast:  No recurrent or residual malignancy demonstrated.    Impression per reading radiologist.      ASSESSMENT AND PLAN   Amelia Caldwell is a 57 year old female with a past medical history of type II diabetes, essential hypertension presenting to radiation oncology for a choroidal melanoma of the right eye.    I recommended eyeplaque brachytherapy and discussed treatment options including brachytherapy with eye plaque, gamma knife, and surgical interventions. I feel that with the location and nature of the choroidal melanoma that brachytherapy with eye plaque is most appropriate at this time. Discussed risks including permanent vision changes/loss in the right eye, high risk for cataract development, risk of glaucoma, and low risk secondary malignancy due to the radiation. The benefits of eye plaque include good rate of local control of disease, and avoidance of surgical removal of eye discussed as well.    This does not prevent  metastatic disease and regular surveillance for metastasis should be performed as indicated by Dr. Spencer. She is scheduled for repeat imaging in February 2023. I discussed results of CT CAP that did not show evidence of metastases at time of imaging on 11/14/22. All of the patient and her daughters' questions were answered. At this point, she would like to proceed with placement of eye plaque. Procedure was described to patient, as well as precautions including avoiding public  places and sharing beds, maintaining distance from young and pregnant people while plaque is in place, use of radiopaque eye patch. Additionally, I do think biopsy and genetic sampling is reasonable in this patient with a family history of multiple cancers. Informed consent was obtained.     Plan:  1. Placement of eye plaque with Dr. Monroy on 1/30/23 with removal on 2/02/23        - anticipate intraoperative biopsy for genetic testing per patient, will defer decision on biopsy to Dr. Monroy   2. Continue to follow with Dr. Trevino of Medical Oncology and Dr. Monroy of Ophthalmology    Thank you for allowing us to participate in this patient's care.  Please feel free to call with any questions or concerns.    Benny Mancia, MS3  [facility] [employer]  12:14 PM 12/12/2022     Nick Aragon MD  PGY-2 Radiation Oncology  Department of Radiation Oncology  [facility]  Phone: [phone]     I was personally present with the resident during the history and exam.  I   discussed the case with the resident and agree with the findings and plan   of care as documented in the resident's note.    Monet Copeland   [phone]

## 2023-01-30 ENCOUNTER — HOSPITAL ENCOUNTER (OUTPATIENT)
Facility: CLINIC | Age: 58
Discharge: HOME | End: 2023-01-30
Attending: OPHTHALMOLOGY | Admitting: OPHTHALMOLOGY
Payer: COMMERCIAL

## 2023-01-30 ENCOUNTER — ANESTHESIA EVENT (OUTPATIENT)
Dept: SURGERY | Facility: CLINIC | Age: 58
End: 2023-01-30
Payer: COMMERCIAL

## 2023-01-30 ENCOUNTER — ANESTHESIA (OUTPATIENT)
Dept: SURGERY | Facility: CLINIC | Age: 58
End: 2023-01-30
Payer: COMMERCIAL

## 2023-01-30 VITALS
BODY MASS INDEX: 38.09 KG/M2 | OXYGEN SATURATION: 97 % | RESPIRATION RATE: 16 BRPM | SYSTOLIC BLOOD PRESSURE: 127 MMHG | HEART RATE: 90 BPM | WEIGHT: 223.11 LBS | DIASTOLIC BLOOD PRESSURE: 82 MMHG | TEMPERATURE: 98.1 F | HEIGHT: 64 IN

## 2023-01-30 DIAGNOSIS — C69.31 MALIGNANT MELANOMA OF CHOROID OF RIGHT EYE (H): Primary | ICD-10-CM

## 2023-01-30 LAB — GLUCOSE BLDC GLUCOMTR-MCNC: 217 MG/DL (ref 70–99)

## 2023-01-30 PROCEDURE — 258N000003 HC RX IP 258 OP 636: Performed by: NURSE ANESTHETIST, CERTIFIED REGISTERED

## 2023-01-30 PROCEDURE — 250N000011 HC RX IP 250 OP 636: Performed by: OPHTHALMOLOGY

## 2023-01-30 PROCEDURE — 250N000009 HC RX 250: Performed by: NURSE ANESTHETIST, CERTIFIED REGISTERED

## 2023-01-30 PROCEDURE — 82962 GLUCOSE BLOOD TEST: CPT

## 2023-01-30 PROCEDURE — 710N000010 HC RECOVERY PHASE 1, LEVEL 2, PER MIN: Performed by: OPHTHALMOLOGY

## 2023-01-30 PROCEDURE — 370N000017 HC ANESTHESIA TECHNICAL FEE, PER MIN: Performed by: OPHTHALMOLOGY

## 2023-01-30 PROCEDURE — 250N000011 HC RX IP 250 OP 636: Performed by: NURSE ANESTHETIST, CERTIFIED REGISTERED

## 2023-01-30 PROCEDURE — 250N000025 HC SEVOFLURANE, PER MIN: Performed by: OPHTHALMOLOGY

## 2023-01-30 PROCEDURE — 710N000012 HC RECOVERY PHASE 2, PER MINUTE: Performed by: OPHTHALMOLOGY

## 2023-01-30 PROCEDURE — 272N000001 HC OR GENERAL SUPPLY STERILE: Performed by: OPHTHALMOLOGY

## 2023-01-30 PROCEDURE — 999N000141 HC STATISTIC PRE-PROCEDURE NURSING ASSESSMENT: Performed by: OPHTHALMOLOGY

## 2023-01-30 PROCEDURE — 250N000009 HC RX 250: Performed by: OPHTHALMOLOGY

## 2023-01-30 PROCEDURE — 360N000077 HC SURGERY LEVEL 4, PER MIN: Performed by: OPHTHALMOLOGY

## 2023-01-30 PROCEDURE — 272N000002 HC OR SUPPLY OTHER OPNP: Performed by: OPHTHALMOLOGY

## 2023-01-30 RX ORDER — TROPICAMIDE 10 MG/ML
1 SOLUTION/ DROPS OPHTHALMIC
Status: COMPLETED | OUTPATIENT
Start: 2023-01-30 | End: 2023-01-30

## 2023-01-30 RX ORDER — SODIUM CHLORIDE, SODIUM LACTATE, POTASSIUM CHLORIDE, CALCIUM CHLORIDE 600; 310; 30; 20 MG/100ML; MG/100ML; MG/100ML; MG/100ML
INJECTION, SOLUTION INTRAVENOUS CONTINUOUS PRN
Status: DISCONTINUED | OUTPATIENT
Start: 2023-01-30 | End: 2023-01-30

## 2023-01-30 RX ORDER — NEOMYCIN SULFATE, POLYMYXIN B SULFATE, AND DEXAMETHASONE 3.5; 10000; 1 MG/G; [USP'U]/G; MG/G
OINTMENT OPHTHALMIC PRN
Status: DISCONTINUED | OUTPATIENT
Start: 2023-01-30 | End: 2023-01-30 | Stop reason: HOSPADM

## 2023-01-30 RX ORDER — HYDROMORPHONE HYDROCHLORIDE 1 MG/ML
0.4 INJECTION, SOLUTION INTRAMUSCULAR; INTRAVENOUS; SUBCUTANEOUS EVERY 5 MIN PRN
Status: DISCONTINUED | OUTPATIENT
Start: 2023-01-30 | End: 2023-01-30 | Stop reason: HOSPADM

## 2023-01-30 RX ORDER — BALANCED SALT SOLUTION 6.4; .75; .48; .3; 3.9; 1.7 MG/ML; MG/ML; MG/ML; MG/ML; MG/ML; MG/ML
SOLUTION OPHTHALMIC PRN
Status: DISCONTINUED | OUTPATIENT
Start: 2023-01-30 | End: 2023-01-30 | Stop reason: HOSPADM

## 2023-01-30 RX ORDER — PROPARACAINE HYDROCHLORIDE 5 MG/ML
1 SOLUTION/ DROPS OPHTHALMIC ONCE
Status: COMPLETED | OUTPATIENT
Start: 2023-01-30 | End: 2023-01-30

## 2023-01-30 RX ORDER — SODIUM CHLORIDE, SODIUM LACTATE, POTASSIUM CHLORIDE, CALCIUM CHLORIDE 600; 310; 30; 20 MG/100ML; MG/100ML; MG/100ML; MG/100ML
INJECTION, SOLUTION INTRAVENOUS CONTINUOUS
Status: DISCONTINUED | OUTPATIENT
Start: 2023-01-30 | End: 2023-01-30 | Stop reason: HOSPADM

## 2023-01-30 RX ORDER — KETOROLAC TROMETHAMINE 30 MG/ML
INJECTION, SOLUTION INTRAMUSCULAR; INTRAVENOUS PRN
Status: DISCONTINUED | OUTPATIENT
Start: 2023-01-30 | End: 2023-01-30

## 2023-01-30 RX ORDER — ONDANSETRON 4 MG/1
4 TABLET, ORALLY DISINTEGRATING ORAL EVERY 30 MIN PRN
Status: DISCONTINUED | OUTPATIENT
Start: 2023-01-30 | End: 2023-01-30 | Stop reason: HOSPADM

## 2023-01-30 RX ORDER — ONDANSETRON 2 MG/ML
4 INJECTION INTRAMUSCULAR; INTRAVENOUS EVERY 30 MIN PRN
Status: DISCONTINUED | OUTPATIENT
Start: 2023-01-30 | End: 2023-01-30 | Stop reason: HOSPADM

## 2023-01-30 RX ORDER — FENTANYL CITRATE-0.9 % NACL/PF 10 MCG/ML
PLASTIC BAG, INJECTION (ML) INTRAVENOUS PRN
Status: DISCONTINUED | OUTPATIENT
Start: 2023-01-30 | End: 2023-01-30

## 2023-01-30 RX ORDER — PROPOFOL 10 MG/ML
INJECTION, EMULSION INTRAVENOUS PRN
Status: DISCONTINUED | OUTPATIENT
Start: 2023-01-30 | End: 2023-01-30

## 2023-01-30 RX ORDER — ATROPINE SULFATE 10 MG/ML
1 SOLUTION/ DROPS OPHTHALMIC
Status: COMPLETED | OUTPATIENT
Start: 2023-01-30 | End: 2023-01-30

## 2023-01-30 RX ORDER — FENTANYL CITRATE 50 UG/ML
INJECTION, SOLUTION INTRAMUSCULAR; INTRAVENOUS PRN
Status: DISCONTINUED | OUTPATIENT
Start: 2023-01-30 | End: 2023-01-30

## 2023-01-30 RX ORDER — ERYTHROMYCIN 5 MG/G
OINTMENT OPHTHALMIC PRN
Status: DISCONTINUED | OUTPATIENT
Start: 2023-01-30 | End: 2023-01-30 | Stop reason: HOSPADM

## 2023-01-30 RX ORDER — ONDANSETRON 2 MG/ML
INJECTION INTRAMUSCULAR; INTRAVENOUS PRN
Status: DISCONTINUED | OUTPATIENT
Start: 2023-01-30 | End: 2023-01-30

## 2023-01-30 RX ORDER — PREDNISOLONE ACETATE 10 MG/ML
SUSPENSION/ DROPS OPHTHALMIC PRN
Status: DISCONTINUED | OUTPATIENT
Start: 2023-01-30 | End: 2023-01-30 | Stop reason: HOSPADM

## 2023-01-30 RX ORDER — FENTANYL CITRATE 50 UG/ML
50 INJECTION, SOLUTION INTRAMUSCULAR; INTRAVENOUS EVERY 5 MIN PRN
Status: DISCONTINUED | OUTPATIENT
Start: 2023-01-30 | End: 2023-01-30 | Stop reason: HOSPADM

## 2023-01-30 RX ORDER — DEXAMETHASONE SODIUM PHOSPHATE 10 MG/ML
INJECTION INTRAMUSCULAR; INTRAVENOUS PRN
Status: DISCONTINUED | OUTPATIENT
Start: 2023-01-30 | End: 2023-01-30 | Stop reason: HOSPADM

## 2023-01-30 RX ORDER — FENTANYL CITRATE 50 UG/ML
25 INJECTION, SOLUTION INTRAMUSCULAR; INTRAVENOUS EVERY 5 MIN PRN
Status: DISCONTINUED | OUTPATIENT
Start: 2023-01-30 | End: 2023-01-30 | Stop reason: HOSPADM

## 2023-01-30 RX ORDER — PHENYLEPHRINE HYDROCHLORIDE 25 MG/ML
1 SOLUTION/ DROPS OPHTHALMIC
Status: COMPLETED | OUTPATIENT
Start: 2023-01-30 | End: 2023-01-30

## 2023-01-30 RX ORDER — BUPIVACAINE HYDROCHLORIDE 7.5 MG/ML
INJECTION, SOLUTION EPIDURAL; RETROBULBAR PRN
Status: DISCONTINUED | OUTPATIENT
Start: 2023-01-30 | End: 2023-01-30 | Stop reason: HOSPADM

## 2023-01-30 RX ORDER — DEXAMETHASONE SODIUM PHOSPHATE 4 MG/ML
INJECTION, SOLUTION INTRA-ARTICULAR; INTRALESIONAL; INTRAMUSCULAR; INTRAVENOUS; SOFT TISSUE PRN
Status: DISCONTINUED | OUTPATIENT
Start: 2023-01-30 | End: 2023-01-30

## 2023-01-30 RX ORDER — EPHEDRINE SULFATE 50 MG/ML
INJECTION, SOLUTION INTRAMUSCULAR; INTRAVENOUS; SUBCUTANEOUS PRN
Status: DISCONTINUED | OUTPATIENT
Start: 2023-01-30 | End: 2023-01-30

## 2023-01-30 RX ORDER — ATROPINE SULFATE 10 MG/ML
SOLUTION/ DROPS OPHTHALMIC PRN
Status: DISCONTINUED | OUTPATIENT
Start: 2023-01-30 | End: 2023-01-30 | Stop reason: HOSPADM

## 2023-01-30 RX ORDER — LIDOCAINE HYDROCHLORIDE 20 MG/ML
INJECTION, SOLUTION INFILTRATION; PERINEURAL PRN
Status: DISCONTINUED | OUTPATIENT
Start: 2023-01-30 | End: 2023-01-30

## 2023-01-30 RX ORDER — HYDROMORPHONE HYDROCHLORIDE 1 MG/ML
0.2 INJECTION, SOLUTION INTRAMUSCULAR; INTRAVENOUS; SUBCUTANEOUS EVERY 5 MIN PRN
Status: DISCONTINUED | OUTPATIENT
Start: 2023-01-30 | End: 2023-01-30 | Stop reason: HOSPADM

## 2023-01-30 RX ADMIN — ATROPINE SULFATE 1 DROP: 10 SOLUTION/ DROPS OPHTHALMIC at 06:44

## 2023-01-30 RX ADMIN — Medication 100 MCG: at 08:44

## 2023-01-30 RX ADMIN — Medication 100 MCG: at 08:06

## 2023-01-30 RX ADMIN — FENTANYL CITRATE 50 MCG: 50 INJECTION, SOLUTION INTRAMUSCULAR; INTRAVENOUS at 07:53

## 2023-01-30 RX ADMIN — MIDAZOLAM 2 MG: 1 INJECTION INTRAMUSCULAR; INTRAVENOUS at 07:44

## 2023-01-30 RX ADMIN — PROPOFOL 20 MG: 10 INJECTION, EMULSION INTRAVENOUS at 08:18

## 2023-01-30 RX ADMIN — Medication 10 MG: at 09:06

## 2023-01-30 RX ADMIN — SODIUM CHLORIDE, POTASSIUM CHLORIDE, SODIUM LACTATE AND CALCIUM CHLORIDE: 600; 310; 30; 20 INJECTION, SOLUTION INTRAVENOUS at 07:53

## 2023-01-30 RX ADMIN — Medication 100 MCG: at 08:02

## 2023-01-30 RX ADMIN — TROPICAMIDE 1 DROP: 10 SOLUTION/ DROPS OPHTHALMIC at 06:38

## 2023-01-30 RX ADMIN — PROPOFOL 120 MG: 10 INJECTION, EMULSION INTRAVENOUS at 07:53

## 2023-01-30 RX ADMIN — PHENYLEPHRINE HYDROCHLORIDE 1 DROP: 25 SOLUTION/ DROPS OPHTHALMIC at 06:44

## 2023-01-30 RX ADMIN — PHENYLEPHRINE HYDROCHLORIDE 0.5 MCG/KG/MIN: 10 INJECTION INTRAVENOUS at 08:53

## 2023-01-30 RX ADMIN — DEXAMETHASONE SODIUM PHOSPHATE 4 MG: 4 INJECTION, SOLUTION INTRA-ARTICULAR; INTRALESIONAL; INTRAMUSCULAR; INTRAVENOUS; SOFT TISSUE at 08:06

## 2023-01-30 RX ADMIN — ATROPINE SULFATE 1 DROP: 10 SOLUTION/ DROPS OPHTHALMIC at 06:50

## 2023-01-30 RX ADMIN — PHENYLEPHRINE HYDROCHLORIDE 1 DROP: 25 SOLUTION/ DROPS OPHTHALMIC at 06:50

## 2023-01-30 RX ADMIN — Medication 100 MCG: at 08:40

## 2023-01-30 RX ADMIN — FENTANYL CITRATE 25 MCG: 50 INJECTION, SOLUTION INTRAMUSCULAR; INTRAVENOUS at 08:18

## 2023-01-30 RX ADMIN — PROPARACAINE HYDROCHLORIDE 1 DROP: 5 SOLUTION/ DROPS OPHTHALMIC at 06:24

## 2023-01-30 RX ADMIN — Medication 100 MCG: at 08:35

## 2023-01-30 RX ADMIN — TROPICAMIDE 1 DROP: 10 SOLUTION/ DROPS OPHTHALMIC at 06:50

## 2023-01-30 RX ADMIN — PHENYLEPHRINE HYDROCHLORIDE 1 DROP: 25 SOLUTION/ DROPS OPHTHALMIC at 06:38

## 2023-01-30 RX ADMIN — FENTANYL CITRATE 25 MCG: 50 INJECTION, SOLUTION INTRAMUSCULAR; INTRAVENOUS at 08:23

## 2023-01-30 RX ADMIN — TROPICAMIDE 1 DROP: 10 SOLUTION/ DROPS OPHTHALMIC at 06:44

## 2023-01-30 RX ADMIN — Medication 10 MG: at 10:05

## 2023-01-30 RX ADMIN — KETOROLAC TROMETHAMINE 30 MG: 30 INJECTION, SOLUTION INTRAMUSCULAR at 10:05

## 2023-01-30 RX ADMIN — ONDANSETRON 4 MG: 2 INJECTION INTRAMUSCULAR; INTRAVENOUS at 08:06

## 2023-01-30 RX ADMIN — LIDOCAINE HYDROCHLORIDE 60 MG: 20 INJECTION, SOLUTION INFILTRATION; PERINEURAL at 07:53

## 2023-01-30 RX ADMIN — Medication 100 MCG: at 08:31

## 2023-01-30 RX ADMIN — ATROPINE SULFATE 1 DROP: 10 SOLUTION/ DROPS OPHTHALMIC at 06:38

## 2023-01-30 ASSESSMENT — ACTIVITIES OF DAILY LIVING (ADL)
ADLS_ACUITY_SCORE: 35

## 2023-01-30 NOTE — OR NURSING
One tray used to sterilize radioactive plaque. Plaque was placed in autoclave using one tray sterilization system tray #6, autoclave #67, cycle # [id number] for maintaining a temp of 270-273 for 10min. Biologic control is positive Lot ## [id number], biologic test result negative Lot #[id number] and read by ANGEL Song CST   monophasic

## 2023-01-30 NOTE — OP NOTE
Radiation Oncology  Operative Note    PReop DX ocular melanoma  Post op DX same  Procedure  COMS eye plaque insertion  RIGHT    eye     Surgeon  Irwin Monroy       Radiation Oncologist SERGEY Copeland    The surgery was done by Dr Monroy.      A dummy plaque was brought onto the surgical field and positioned to fully encompass the region with safety margins and temporarily sutured into place  Transillumination and B-scan ultrasonography were used to confirm adequate coverage and centration of the dummy plaque to the intraocular tumor.     Active I-125 plaque brachytherapy placement followed attempted  biopsy of the lesion.  Biopsy could not becompleted because of a clot in the tubing.      We  confirmed to have 9   active seeds in the active plaqe       The active plaque was brought onto the surgical field and the previously placed sutures secured .    B-Scan ultrasonography was performed to reconfirm coverage of the tumor by the plaque with adequate margins .      He will return  for plaque removal  2/2/23     My Physcict Dr Zainab Elizondo did appropriate surveys and gave written radiation instuctions to the patient.    Monet Copeland  [phone] pager

## 2023-01-30 NOTE — ANESTHESIA PREPROCEDURE EVALUATION
Anesthesia Pre-Procedure Evaluation    Patient: Cheyenne Caldwell   MRN: [id number] : 1965        Procedure : Procedure(s):  Right INSERTION, RADIOACTIVE PLAQUE, EYE          Past Medical History:   Diagnosis Date     Benign essential hypertension      Choroidal malignant melanoma, right (H)      Diabetes mellitus, type 2 (H)      Gastroesophageal reflux disease       Past Surgical History:   Procedure Laterality Date     TONSILLECTOMY        Allergies   Allergen Reactions     Penicillins Other (See Comments) and Unknown     swelling        Social History     Tobacco Use     Smoking status: Never     Smokeless tobacco: Never   Substance Use Topics     Alcohol use: Not Currently     Comment: last use       Wt Readings from Last 1 Encounters:   23 101.2 kg (223 lb 1.7 oz)        Anesthesia Evaluation   Pt has not had prior anesthetic         ROS/MED HX  ENT/Pulmonary:  - neg pulmonary ROS     Neurologic:  - neg neurologic ROS     Cardiovascular:     (+) hypertension-----    METS/Exercise Tolerance:     Hematologic:  - neg hematologic  ROS     Musculoskeletal:  - neg musculoskeletal ROS     GI/Hepatic:     (+) GERD,     Renal/Genitourinary:       Endo:     (+) type II DM,     Psychiatric/Substance Use:  - neg psychiatric ROS     Infectious Disease:  - neg infectious disease ROS     Malignancy: Comment: Choroidal melanoma - R      Other:  - neg other ROS          Physical Exam    Airway  airway exam normal      Mallampati: II       Respiratory Devices and Support         Dental       (+) Completely normal teeth      Cardiovascular   cardiovascular exam normal          Pulmonary   pulmonary exam normal                OUTSIDE LABS:  CBC:   Lab Results   Component Value Date    WBC 6.7 2022    HGB 13.8 2022    HCT 39.4 2022     2022     BMP:   Lab Results   Component Value Date     (L) 2022    POTASSIUM 4.3 2022    CHLORIDE 100 2022    CO2 24  11/14/2022    BUN 18 11/14/2022    CR 0.66 11/14/2022     (H) 01/30/2023     (H) 11/14/2022     COAGS: No results found for: PTT, INR, FIBR  POC: No results found for: BGM, HCG, HCGS  HEPATIC:   Lab Results   Component Value Date    ALBUMIN 3.8 11/14/2022    PROTTOTAL 7.5 11/14/2022    ALT 57 (H) 11/14/2022    AST 22 11/14/2022    ALKPHOS 120 11/14/2022    BILITOTAL 0.5 11/14/2022     OTHER:   Lab Results   Component Value Date    KRAIG 9.1 11/14/2022       Anesthesia Plan    ASA Status:  2   NPO Status:  NPO Appropriate    Anesthesia Type: General.     - Airway: LMA   Induction: Intravenous.   Maintenance: Balanced.        Consents    Anesthesia Plan(s) and associated risks, benefits, and realistic alternatives discussed. Questions answered and patient/representative(s) expressed understanding.    - Discussed:     - Discussed with:  Patient      - Extended Intubation/Ventilatory Support Discussed: No.      - Patient is DNR/DNI Status: No    Use of blood products discussed: No .     Postoperative Care    Pain management: Multi-modal analgesia, IV analgesics.   PONV prophylaxis: Dexamethasone or Solumedrol, Ondansetron (or other 5HT-3)     Comments:                Narendra Connolly MD

## 2023-01-30 NOTE — INTERVAL H&P NOTE
"I have reviewed the surgical (or preoperative) H&P that is linked to this encounter, and examined the patient. There are no significant changes    Clinical Conditions Present on Arrival:  Clinically Significant Risk Factors Present on Admission                    # Obesity: Estimated body mass index is 38.3 kg/m  as calculated from the following:    Height as of this encounter: 1.626 m (5' 4\").    Weight as of this encounter: 101.2 kg (223 lb 1.7 oz).       "

## 2023-01-30 NOTE — OP NOTE
OPERATIVE NOTE     Patient: Cheyenne Caldwell     Date of Operation(s)/Procedure(s): 1/30/2023     Pre-op Diagnosis:   Choroidal melanoma, right eye     Post-op Diagnosis:   Same as above     Operation(s):   Brachytherapy insertion   Biopsy of choroidal lesion      Attending Surgeon: Irwin Monroy M.D., Ph.D.     Assistant: Ifrah Pascual M.D.     Anesthesia Type: General anesthesia    Indications: Patient presented for evaluation of ocular tumor in the operative eye. An exam revealed findings as stated above in the operative eye. R/B/A/ to eye surgery included but are not limited to death, loss of vision, loss of eye, pain, bleeding, infection, need for additional surgery, retinal detachment, accelerated cataract formation, glaucoma, need for corrective lenses, corneal decompensation, cosmetic disfigurement, and tumor progression. Patient understood these risks and elected to proceed with procedure as planned.     Description of Procedure:     Patient was met in the preoperative holding area where the operative eye was confirmed to be the correct eye with both the patient and the consent form. The operative eye was then marked, and the patient was brought to the operating room where an official time out was performed.     Patient was placed under general anesthesia by the anesthesia team. Next, the face was prepped and draped in the usual sterile ophthalmic fashion and lid speculum placed in the operative eye.      A 360 degrees conjunctival peritomy was performed. Tenotomy scissors were used to separate the Tenon s capsule in the four oblique globe quadrants. Next, each of the four rectus muscles were identified with the muscle hook, cleaned, and looped with 2-0 silk ligature. The globe was examined and there were NO signs of scleral thinning.      Transpupillary transillumination was performed, and the tumor was identified by its casting a shadow on the transillumination. This was marked with blue ink  externally on the sclera.     An extraocular muscle was required to be removed. The muscle was identified on a muscle hook and cleaned. A 6-0 double-armed Vicryl suture was placed through the anterior insertion point of the muscle with locking stitch from both of its margins. Electrocautery was used to cauterize the insertion point just anterior to the suture. Stacy scissors were used to transsect the muscle from the insertion. The muscle was retracted from the surgical field.      Next, a dummy plaque was brought onto the surgical field and positioned to fully encompass the region with safety margins. 5-0 Mersiline suture was used to suture the dummy plaque to the sclera at suture lugs #3 and 4. Transillumination and B-scan ultrasonography were used to confirm adequate coverage and centration of the dummy plaque to the intraocular tumor. The temporary ties were removed, and the dummy plaque was removed.      Transvitreal vitrectomy-assisted Biopsy: Attention was then turned towards obtaining a biopsy specimen of the tumor. Using a 27- gauge vitrectomy system, trocars were used to place transcleral cannulas 4 mm from the limbus in the superotemporal, inferotemporal, and superonasal quadrants. A primed infusion line was secured to the inferotemporal cannula with the tip visualized directly in the vitreous cavity prior to the initiation of infusion. Next using a wide field viewing system and an endoilluminating light pipe, the unprimed cutter was used to enter the substance of the tumor and engaged with low cut rate and high aspiration to obtain tumor sample. The cutter was withdrawn with care not to enlarge the retinotomy entry site.  There was mild bleeding noted over the site of entry.     The cutter was carefully removed from the eye, and, with active reflex, the specimen collected in a sterile tube.  A long 27 g needle was used to aspirate tumor fragments and placed into the transport buffer provided by [employer] was used to flush the needle and a specimen was not seen floating within the media. We decided to abandon further sample collection.    The cannulas were removed and the sclerotomies closed with 7-0 Vicryl suture. Double freeze-thaw cryotherapy was applied to the sclerotomies.     Active I-125 plaque brachytherapy placement: The Radiation Oncology team, including attending and Radiation Physicist, were called to deliver the active plaque. I-125 plaque was confirmed to have 9 active seeds. The active plaque was brought onto the surgical field and the previously placed sutures secured with a 3-1-1 knot and the ends kept long.      The previously disinserted muscle was re-secured to its original insertion and tied with a temporary loop in a hang-back fashion in order to avoid the field of the active plaque.      B-Scan ultrasonography was performed to reconfirm coverage of the tumor by the plaque with adequate margins and centration. Indirect ophthalmoscopy was performed which noted no signs of rhegmatogenous or iatrogenic retinal breaks or detachment. The silk sutures were removed, and the conjunctiva approximated with 6-0 chromic gut suture.      Dressing: A lead shield was placed on the eye in order to prevent undue radiation exposure to the patient or care-givers.      The patient was awakened and taken to the recovery room in good condition having tolerated the procedure well. There were no complications.      Estimated Blood Loss: <1cc   Drains: none   Specimens: none   Implants: brachytherapy plaque   Complications: none   Disposition: PACU - hemodynamically stable   Condition: stable

## 2023-01-30 NOTE — DISCHARGE INSTRUCTIONS

## 2023-01-30 NOTE — ANESTHESIA PROCEDURE NOTES
Airway       Patient location during procedure: OR       Procedure Start/Stop Times: 1/30/2023 8:08 AM  Staff -        CRNA: Emmy Ambrosio APRN CRNA       Performed By: CRNA  Consent for Airway        Urgency: elective  Indications and Patient Condition       Indications for airway management: kell-procedural       Induction type:intravenous       Mask difficulty assessment: 1 - vent by mask    Final Airway Details       Final airway type: supraglottic airway    Supraglottic Airway Details        Type: LMA       Brand: Air-Q       LMA size: 4.5    Post intubation assessment        Placement verified by: capnometry, equal breath sounds and chest rise        Number of attempts at approach: 1       Secured with: silk tape       Ease of procedure: easy       Dentition: Intact and Unchanged    Medication(s) Administered   Medication Administration Time: 1/30/2023 8:08 AM

## 2023-01-30 NOTE — ANESTHESIA CARE TRANSFER NOTE
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    Anesthesia Type:   General     Note:    Oropharynx: oropharynx clear of all foreign objects  Level of Consciousness: drowsy  Oxygen Supplementation: face mask  Level of Supplemental Oxygen (L/min / FiO2): 8  Independent Airway: airway patency satisfactory and stable  Dentition: dentition unchanged  Vital Signs Stable: post-procedure vital signs reviewed and stable  Report to RN Given: handoff report given  Patient transferred to: PACU    Handoff Report: Identifed the Patient, Identified the Reponsible Provider, Reviewed the pertinent medical history, Discussed the surgical course, Reviewed Intra-OP anesthesia mangement and issues during anesthesia, Set expectations for post-procedure period and Allowed opportunity for questions and acknowledgement of understanding      Vitals:  Vitals Value Taken Time   /76 01/30/23 1018   Temp     Pulse 96 01/30/23 1024   Resp 10 01/30/23 1024   SpO2 97 % 01/30/23 1024   Vitals shown include unvalidated device data.    Electronically Signed By: THONY Thomas CRNA  January 30, 2023  10:24 AM

## 2023-01-30 NOTE — OR NURSING
Dr Connolly notified of elevated blood sugar.  Patient blood sugar communicated to Dr. Connolly for a level of 217.  Orders received to not treat blood sugar at this time.

## 2023-01-30 NOTE — OR NURSING
OR 7 was cleared by Nuclear Medicine before the patient or staff left the OR, and before instruments and trash were removed from the OR

## 2023-01-30 NOTE — ANESTHESIA POSTPROCEDURE EVALUATION
Patient: Cheyenne Caldwell    Procedure: Procedure(s):  Right INSERTION, RADIOACTIVE PLAQUE, RIGHT EYE CRYO       Anesthesia Type:  General    Note:  Disposition: Outpatient   Postop Pain Control: Uneventful            Sign Out: Well controlled pain   PONV: No   Neuro/Psych: Uneventful            Sign Out: Acceptable/Baseline neuro status   Airway/Respiratory: Uneventful            Sign Out: Acceptable/Baseline resp. status   CV/Hemodynamics: Uneventful            Sign Out: Acceptable CV status; No obvious hypovolemia; No obvious fluid overload   Other NRE: NONE   DID A NON-ROUTINE EVENT OCCUR? No           Last vitals:  Vitals Value Taken Time   /78 01/30/23 1045   Temp 36.7  C (98.1  F) 01/30/23 1045   Pulse 93 01/30/23 1050   Resp 14 01/30/23 1050   SpO2 94 % 01/30/23 1100   Vitals shown include unvalidated device data.    Electronically Signed By: Narendra Connolly MD  January 30, 2023  11:52 AM

## 2023-01-31 RX ORDER — OMEGA-3 FATTY ACIDS/FISH OIL 300-1000MG
200 CAPSULE ORAL EVERY 4 HOURS PRN
COMMUNITY

## 2023-01-31 RX ORDER — ACETAMINOPHEN 325 MG/1
325-650 TABLET ORAL EVERY 6 HOURS PRN
COMMUNITY

## 2023-02-02 ENCOUNTER — HOSPITAL ENCOUNTER (OUTPATIENT)
Facility: CLINIC | Age: 58
Discharge: HOME | End: 2023-02-02
Attending: OPHTHALMOLOGY | Admitting: OPHTHALMOLOGY
Payer: COMMERCIAL

## 2023-02-02 ENCOUNTER — ANESTHESIA EVENT (OUTPATIENT)
Dept: SURGERY | Facility: CLINIC | Age: 58
End: 2023-02-02
Payer: COMMERCIAL

## 2023-02-02 ENCOUNTER — ANESTHESIA (OUTPATIENT)
Dept: SURGERY | Facility: CLINIC | Age: 58
End: 2023-02-02
Payer: COMMERCIAL

## 2023-02-02 VITALS
HEART RATE: 70 BPM | BODY MASS INDEX: 37.64 KG/M2 | DIASTOLIC BLOOD PRESSURE: 83 MMHG | RESPIRATION RATE: 12 BRPM | WEIGHT: 220.46 LBS | TEMPERATURE: 97.9 F | HEIGHT: 64 IN | OXYGEN SATURATION: 97 % | SYSTOLIC BLOOD PRESSURE: 130 MMHG

## 2023-02-02 DIAGNOSIS — C69.31 CHOROID MELANOMA OF RIGHT EYE (H): Primary | ICD-10-CM

## 2023-02-02 LAB
GLUCOSE BLDC GLUCOMTR-MCNC: 137 MG/DL (ref 70–99)
GLUCOSE BLDC GLUCOMTR-MCNC: 142 MG/DL (ref 70–99)

## 2023-02-02 PROCEDURE — 250N000011 HC RX IP 250 OP 636: Performed by: NURSE ANESTHETIST, CERTIFIED REGISTERED

## 2023-02-02 PROCEDURE — 710N000012 HC RECOVERY PHASE 2, PER MINUTE: Performed by: OPHTHALMOLOGY

## 2023-02-02 PROCEDURE — 999N000141 HC STATISTIC PRE-PROCEDURE NURSING ASSESSMENT: Performed by: OPHTHALMOLOGY

## 2023-02-02 PROCEDURE — 82962 GLUCOSE BLOOD TEST: CPT

## 2023-02-02 PROCEDURE — 250N000011 HC RX IP 250 OP 636: Performed by: OPHTHALMOLOGY

## 2023-02-02 PROCEDURE — 360N000076 HC SURGERY LEVEL 3, PER MIN: Performed by: OPHTHALMOLOGY

## 2023-02-02 PROCEDURE — 370N000017 HC ANESTHESIA TECHNICAL FEE, PER MIN: Performed by: OPHTHALMOLOGY

## 2023-02-02 PROCEDURE — 250N000009 HC RX 250: Performed by: OPHTHALMOLOGY

## 2023-02-02 PROCEDURE — 272N000001 HC OR GENERAL SUPPLY STERILE: Performed by: OPHTHALMOLOGY

## 2023-02-02 PROCEDURE — 258N000003 HC RX IP 258 OP 636: Performed by: NURSE ANESTHETIST, CERTIFIED REGISTERED

## 2023-02-02 RX ORDER — FENTANYL CITRATE 50 UG/ML
25 INJECTION, SOLUTION INTRAMUSCULAR; INTRAVENOUS EVERY 5 MIN PRN
Status: DISCONTINUED | OUTPATIENT
Start: 2023-02-02 | End: 2023-02-02 | Stop reason: HOSPADM

## 2023-02-02 RX ORDER — BALANCED SALT SOLUTION 6.4; .75; .48; .3; 3.9; 1.7 MG/ML; MG/ML; MG/ML; MG/ML; MG/ML; MG/ML
SOLUTION OPHTHALMIC PRN
Status: DISCONTINUED | OUTPATIENT
Start: 2023-02-02 | End: 2023-02-02 | Stop reason: HOSPADM

## 2023-02-02 RX ORDER — FENTANYL CITRATE 50 UG/ML
50 INJECTION, SOLUTION INTRAMUSCULAR; INTRAVENOUS EVERY 5 MIN PRN
Status: DISCONTINUED | OUTPATIENT
Start: 2023-02-02 | End: 2023-02-02 | Stop reason: HOSPADM

## 2023-02-02 RX ORDER — NEOMYCIN SULFATE, POLYMYXIN B SULFATE, AND DEXAMETHASONE 3.5; 10000; 1 MG/G; [USP'U]/G; MG/G
OINTMENT OPHTHALMIC PRN
Status: DISCONTINUED | OUTPATIENT
Start: 2023-02-02 | End: 2023-02-02 | Stop reason: HOSPADM

## 2023-02-02 RX ORDER — DEXAMETHASONE SODIUM PHOSPHATE 4 MG/ML
INJECTION, SOLUTION INTRA-ARTICULAR; INTRALESIONAL; INTRAMUSCULAR; INTRAVENOUS; SOFT TISSUE PRN
Status: DISCONTINUED | OUTPATIENT
Start: 2023-02-02 | End: 2023-02-02

## 2023-02-02 RX ORDER — DIPHENHYDRAMINE HCL 25 MG
25 CAPSULE ORAL EVERY 6 HOURS PRN
Status: DISCONTINUED | OUTPATIENT
Start: 2023-02-02 | End: 2023-02-02 | Stop reason: HOSPADM

## 2023-02-02 RX ORDER — PROPOFOL 10 MG/ML
INJECTION, EMULSION INTRAVENOUS PRN
Status: DISCONTINUED | OUTPATIENT
Start: 2023-02-02 | End: 2023-02-02

## 2023-02-02 RX ORDER — CYCLOPENTOLAT/TROPIC/PHENYLEPH 1%-1%-2.5%
1 DROPS (EA) OPHTHALMIC (EYE)
Status: DISCONTINUED | OUTPATIENT
Start: 2023-02-02 | End: 2023-02-02 | Stop reason: HOSPADM

## 2023-02-02 RX ORDER — SODIUM CHLORIDE, SODIUM LACTATE, POTASSIUM CHLORIDE, CALCIUM CHLORIDE 600; 310; 30; 20 MG/100ML; MG/100ML; MG/100ML; MG/100ML
INJECTION, SOLUTION INTRAVENOUS CONTINUOUS PRN
Status: DISCONTINUED | OUTPATIENT
Start: 2023-02-02 | End: 2023-02-02

## 2023-02-02 RX ORDER — FENTANYL CITRATE 50 UG/ML
INJECTION, SOLUTION INTRAMUSCULAR; INTRAVENOUS PRN
Status: DISCONTINUED | OUTPATIENT
Start: 2023-02-02 | End: 2023-02-02

## 2023-02-02 RX ORDER — ATROPINE SULFATE 10 MG/ML
SOLUTION/ DROPS OPHTHALMIC PRN
Status: DISCONTINUED | OUTPATIENT
Start: 2023-02-02 | End: 2023-02-02 | Stop reason: HOSPADM

## 2023-02-02 RX ORDER — HYDROMORPHONE HYDROCHLORIDE 1 MG/ML
0.4 INJECTION, SOLUTION INTRAMUSCULAR; INTRAVENOUS; SUBCUTANEOUS EVERY 5 MIN PRN
Status: DISCONTINUED | OUTPATIENT
Start: 2023-02-02 | End: 2023-02-02 | Stop reason: HOSPADM

## 2023-02-02 RX ORDER — DEXAMETHASONE SODIUM PHOSPHATE 4 MG/ML
4 INJECTION, SOLUTION INTRA-ARTICULAR; INTRALESIONAL; INTRAMUSCULAR; INTRAVENOUS; SOFT TISSUE
Status: DISCONTINUED | OUTPATIENT
Start: 2023-02-02 | End: 2023-02-02 | Stop reason: HOSPADM

## 2023-02-02 RX ORDER — DIMENHYDRINATE 50 MG/ML
25 INJECTION, SOLUTION INTRAMUSCULAR; INTRAVENOUS
Status: DISCONTINUED | OUTPATIENT
Start: 2023-02-02 | End: 2023-02-02 | Stop reason: HOSPADM

## 2023-02-02 RX ORDER — ONDANSETRON 2 MG/ML
4 INJECTION INTRAMUSCULAR; INTRAVENOUS EVERY 30 MIN PRN
Status: DISCONTINUED | OUTPATIENT
Start: 2023-02-02 | End: 2023-02-02 | Stop reason: HOSPADM

## 2023-02-02 RX ORDER — OXYCODONE HYDROCHLORIDE 5 MG/1
5 TABLET ORAL EVERY 4 HOURS PRN
Status: DISCONTINUED | OUTPATIENT
Start: 2023-02-02 | End: 2023-02-02 | Stop reason: HOSPADM

## 2023-02-02 RX ORDER — OXYCODONE HYDROCHLORIDE 5 MG/1
10 TABLET ORAL EVERY 4 HOURS PRN
Status: DISCONTINUED | OUTPATIENT
Start: 2023-02-02 | End: 2023-02-02 | Stop reason: HOSPADM

## 2023-02-02 RX ORDER — BUPIVACAINE HYDROCHLORIDE 7.5 MG/ML
INJECTION, SOLUTION EPIDURAL; RETROBULBAR PRN
Status: DISCONTINUED | OUTPATIENT
Start: 2023-02-02 | End: 2023-02-02 | Stop reason: HOSPADM

## 2023-02-02 RX ORDER — ONDANSETRON 4 MG/1
4 TABLET, ORALLY DISINTEGRATING ORAL EVERY 30 MIN PRN
Status: DISCONTINUED | OUTPATIENT
Start: 2023-02-02 | End: 2023-02-02 | Stop reason: HOSPADM

## 2023-02-02 RX ORDER — SODIUM CHLORIDE, SODIUM LACTATE, POTASSIUM CHLORIDE, CALCIUM CHLORIDE 600; 310; 30; 20 MG/100ML; MG/100ML; MG/100ML; MG/100ML
INJECTION, SOLUTION INTRAVENOUS CONTINUOUS
Status: DISCONTINUED | OUTPATIENT
Start: 2023-02-02 | End: 2023-02-02 | Stop reason: HOSPADM

## 2023-02-02 RX ORDER — DEXAMETHASONE SODIUM PHOSPHATE 10 MG/ML
INJECTION INTRAMUSCULAR; INTRAVENOUS PRN
Status: DISCONTINUED | OUTPATIENT
Start: 2023-02-02 | End: 2023-02-02 | Stop reason: HOSPADM

## 2023-02-02 RX ORDER — PROPOFOL 10 MG/ML
INJECTION, EMULSION INTRAVENOUS CONTINUOUS PRN
Status: DISCONTINUED | OUTPATIENT
Start: 2023-02-02 | End: 2023-02-02

## 2023-02-02 RX ORDER — LABETALOL HYDROCHLORIDE 5 MG/ML
10 INJECTION, SOLUTION INTRAVENOUS
Status: DISCONTINUED | OUTPATIENT
Start: 2023-02-02 | End: 2023-02-02 | Stop reason: HOSPADM

## 2023-02-02 RX ORDER — HYDROMORPHONE HYDROCHLORIDE 1 MG/ML
0.2 INJECTION, SOLUTION INTRAMUSCULAR; INTRAVENOUS; SUBCUTANEOUS EVERY 5 MIN PRN
Status: DISCONTINUED | OUTPATIENT
Start: 2023-02-02 | End: 2023-02-02 | Stop reason: HOSPADM

## 2023-02-02 RX ORDER — DIPHENHYDRAMINE HYDROCHLORIDE 50 MG/ML
25 INJECTION INTRAMUSCULAR; INTRAVENOUS EVERY 6 HOURS PRN
Status: DISCONTINUED | OUTPATIENT
Start: 2023-02-02 | End: 2023-02-02 | Stop reason: HOSPADM

## 2023-02-02 RX ORDER — TETRACAINE HYDROCHLORIDE 5 MG/ML
SOLUTION OPHTHALMIC PRN
Status: DISCONTINUED | OUTPATIENT
Start: 2023-02-02 | End: 2023-02-02 | Stop reason: HOSPADM

## 2023-02-02 RX ADMIN — PROPOFOL 100 MCG/KG/MIN: 10 INJECTION, EMULSION INTRAVENOUS at 12:41

## 2023-02-02 RX ADMIN — DEXAMETHASONE SODIUM PHOSPHATE 6 MG: 4 INJECTION, SOLUTION INTRA-ARTICULAR; INTRALESIONAL; INTRAMUSCULAR; INTRAVENOUS; SOFT TISSUE at 12:53

## 2023-02-02 RX ADMIN — SODIUM CHLORIDE, POTASSIUM CHLORIDE, SODIUM LACTATE AND CALCIUM CHLORIDE: 600; 310; 30; 20 INJECTION, SOLUTION INTRAVENOUS at 12:35

## 2023-02-02 RX ADMIN — PROPOFOL 30 MG: 10 INJECTION, EMULSION INTRAVENOUS at 12:45

## 2023-02-02 RX ADMIN — Medication 1 DROP: at 11:50

## 2023-02-02 RX ADMIN — PROPOFOL 70 MG: 10 INJECTION, EMULSION INTRAVENOUS at 12:41

## 2023-02-02 RX ADMIN — Medication 1 DROP: at 11:58

## 2023-02-02 RX ADMIN — FENTANYL CITRATE 25 MCG: 50 INJECTION, SOLUTION INTRAMUSCULAR; INTRAVENOUS at 12:44

## 2023-02-02 RX ADMIN — MIDAZOLAM 2 MG: 1 INJECTION INTRAMUSCULAR; INTRAVENOUS at 12:35

## 2023-02-02 ASSESSMENT — ACTIVITIES OF DAILY LIVING (ADL): ADLS_ACUITY_SCORE: 35

## 2023-02-02 NOTE — ANESTHESIA POSTPROCEDURE EVALUATION
Patient: Cheyenne Caldwell    Procedure: Procedure(s):  REMOVAL, RADIOACTIVE PLAQUE, RIGHT EYE       Anesthesia Type:  No value filed.    Note:  Disposition: Outpatient   Postop Pain Control: Uneventful            Sign Out: Well controlled pain   PONV: No   Neuro/Psych: Uneventful            Sign Out: Acceptable/Baseline neuro status   Airway/Respiratory: Uneventful            Sign Out: Acceptable/Baseline resp. status   CV/Hemodynamics: Uneventful            Sign Out: Acceptable CV status; No obvious hypovolemia; No obvious fluid overload   Other NRE: NONE   DID A NON-ROUTINE EVENT OCCUR? No           Last vitals:  Vitals Value Taken Time   /83 02/02/23 1415   Temp 36.6  C (97.9  F) 02/02/23 1415   Pulse 70 02/02/23 1415   Resp 12 02/02/23 1415   SpO2 96 % 02/02/23 1417   Vitals shown include unvalidated device data.    Electronically Signed By: Malina Saleem MD  February 2, 2023  2:59 PM

## 2023-02-02 NOTE — DISCHARGE INSTRUCTIONS

## 2023-02-02 NOTE — ANESTHESIA CARE TRANSFER NOTE
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    Anesthesia Type:   No value filed.     Note:    Oropharynx: oropharynx clear of all foreign objects  Level of Consciousness: awake  Oxygen Supplementation: room air    Independent Airway: airway patency satisfactory and stable  Dentition: dentition unchanged  Vital Signs Stable: post-procedure vital signs reviewed and stable  Report to RN Given: handoff report given  Patient transferred to: Phase II  Comments: Regular respirations and patent airway. VSS. IV patent and infusing. Pt resting comfortably. Report given to RN    Handoff Report: Identifed the Patient, Identified the Reponsible Provider, Reviewed the pertinent medical history, Discussed the surgical course, Reviewed Intra-OP anesthesia mangement and issues during anesthesia, Set expectations for post-procedure period and Allowed opportunity for questions and acknowledgement of understanding      Vitals:  Vitals Value Taken Time   /79    Temp     Pulse     Resp     SpO2 96 % 02/02/23 1327   Vitals shown include unvalidated device data.    Electronically Signed By: THONY Oropeza CRNA  February 2, 2023  1:28 PM

## 2023-02-02 NOTE — OP NOTE
OPERATIVE NOTE     Patient: Cheyenne Caldwell     Date of Operation(s)/Procedure(s): 2/2/2023     Pre-op Diagnosis:   Choroidal melanoma, right eye     Post-op Diagnosis:   Same as above     Operation(s):   Removal of brachytherapy plaque      Attending Surgeon: Irwin Monroy M.D., Ph.D.     Assistant: None     Anesthesia Type: MAPS, Retrobulbar     Indications: Patient presented for evaluation of ocular tumor in the operative eye. An exam revealed findings as stated above in the operative eye. R/B/A/ to eye surgery included but are not limited to death, loss of vision, loss of eye, pain, bleeding, infection, need for additional surgery, retinal detachment, accelerated cataract formation, glaucoma, need for corrective lenses, corneal decompensation, cosmetic disfigurement, and tumor progression. Patient understood these risks and elected to proceed with procedure as planned.     Description of Procedure:     Patient was met in the preoperative holding area where the operative eye was confirmed to be the correct eye with both the patient and the consent form. The operative eye was then marked, and the patient was brought to the operating room where an official time out was performed.     Transient general anesthesia was induced, at which time a 5 cc injection of a 1:1 mix of 4% Lidocaine with 0.75% Marcaine with Wydase was placed into the retrobulbar space of the operative eye. Appropriate globe akinesia and anesthesia was induced. Next, the face was prepped and draped in the usual sterile ophthalmic fashion and lid speculum placed in the operative eye.      A 180 degrees conjunctival peritomy was performed in the temporal portion. Tenotomy scissors were used to separate the Tenon s capsule.The globe was examined and there were NO signs of scleral thinning.     The plaque was identified in the lateral quadrant of the globe. Using forceps and Stacy scissors, the Mersiline sutures were cut and removed from the  sclera that were securing the plaque to the globe. The plaque was then removed from the forceps and placed into a stainless steel bowl filled with water. The active radioactive seeds were counted and confirmed to be the same as the count pre-placement. The plaque was then handed off to the Radiation Oncology team off the surgical field.     Next, the imbricated muscle was refixed into place at its original insertion with the preplaced vicryl sutures and tied in 3-1-1 fasion. The conjunctiva was then closed with 8-0 chromic gut suture. Subconjunctival injections of Ancef and Decadron were performed. Speculum and drapes were removed from the eye, and drops of Pred Forte and Vigamox were placed into the eye followed by Erythromycin ointment. The eye was patched with gauze and shield.      The patient was awakened and taken to the recovery room in good condition having tolerated the procedure well. There were no complications.      Estimated Blood Loss: <1cc   Drains: none   Specimens: none   Implants: none   Complications: none   Disposition: PACU - hemodynamically stable   Condition: stable

## 2023-02-02 NOTE — OR NURSING
9 Seeds verified by Zainab King and Dr. Monroy. Room surveyed by radiation oncology and reading were stated to be measured at acceptable levels.

## 2023-02-02 NOTE — ANESTHESIA PREPROCEDURE EVALUATION
Anesthesia Pre-Procedure Evaluation    Patient: Cheyenne Caldwell   MRN: [id number] : 1965        Procedure : Procedure(s):  REMOVAL, RADIOACTIVE PLAQUE, RIGHT EYE          Past Medical History:   Diagnosis Date     Benign essential hypertension      Choroidal malignant melanoma, right (H)      Diabetes mellitus, type 2 (H)      Gastroesophageal reflux disease       Past Surgical History:   Procedure Laterality Date     INSERT PLAQUE RADIOACTIVE Right 2023    Procedure: Right INSERTION, RADIOACTIVE PLAQUE, BIOPSY OF RIGHT EYE, RIGHT EYE CRYO;  Surgeon: Irwin Monroy MD;  Location: [facility] OR     TONSILLECTOMY        Allergies   Allergen Reactions     Penicillins Other (See Comments) and Unknown     swelling        Social History     Tobacco Use     Smoking status: Never     Smokeless tobacco: Never   Substance Use Topics     Alcohol use: Not Currently     Comment: last use       Wt Readings from Last 1 Encounters:   23 101.2 kg (223 lb 1.7 oz)        Anesthesia Evaluation            ROS/MED HX  ENT/Pulmonary:       Neurologic:       Cardiovascular:     (+) hypertension-----    METS/Exercise Tolerance:     Hematologic:       Musculoskeletal:       GI/Hepatic:     (+) GERD, Asymptomatic on medication,     Renal/Genitourinary:       Endo:     (+) type II DM, Not using insulin, - not using insulin pump. not previously admitted for DM/DKA. Obesity,     Psychiatric/Substance Use:     (+) psychiatric history anxiety and depression     Infectious Disease:       Malignancy: Comment: Choroidal melanoma  (+) Malignancy, History of Other.Other CA status post Radiation.    Other:            Physical Exam    Airway        Mallampati: II   TM distance: > 3 FB   Neck ROM: full   Mouth opening: > 3 cm    Respiratory Devices and Support         Dental           Cardiovascular   cardiovascular exam normal       Rhythm and rate: regular and normal     Pulmonary   pulmonary exam normal        breath sounds clear to  auscultation           OUTSIDE LABS:  CBC:   CBC RESULTS: Recent Labs   Lab Test 11/14/22  0740   WBC 6.7   HGB 13.8   HCT 39.4        Last Comprehensive Metabolic Panel:  Recent Labs   Lab Test 01/30/23  0703 11/14/22  0740   NA  --  132*   POTASSIUM  --  4.3   CHLORIDE  --  100   CO2  --  24   ANIONGAP  --  8   BUN  --  18   CR  --  0.66   GFRESTIMATED  --  >90   * 197*   KRAIG  --  9.1        Lab Results   Component Value Date    WBC 6.7 11/14/2022    HGB 13.8 11/14/2022    HCT 39.4 11/14/2022     11/14/2022     BMP:   Lab Results   Component Value Date     (L) 11/14/2022    POTASSIUM 4.3 11/14/2022    CHLORIDE 100 11/14/2022    CO2 24 11/14/2022    BUN 18 11/14/2022    CR 0.66 11/14/2022     (H) 01/30/2023     (H) 11/14/2022     COAGS: No results found for: PTT, INR, FIBR  POC: No results found for: BGM, HCG, HCGS  HEPATIC:   Lab Results   Component Value Date    ALBUMIN 3.8 11/14/2022    PROTTOTAL 7.5 11/14/2022    ALT 57 (H) 11/14/2022    AST 22 11/14/2022    ALKPHOS 120 11/14/2022    BILITOTAL 0.5 11/14/2022     OTHER:   Lab Results   Component Value Date    KRAIG 9.1 11/14/2022       Anesthesia Plan    ASA Status:  2   NPO Status:  NPO Appropriate    Anesthesia Type: MAC.     - Reason for MAC: straight local not clinically adequate              Consents    Anesthesia Plan(s) and associated risks, benefits, and realistic alternatives discussed. Questions answered and patient/representative(s) expressed understanding.    - Discussed:     - Discussed with:  Patient      - Extended Intubation/Ventilatory Support Discussed: No.      - Patient is DNR/DNI Status: No    Use of blood products discussed: No .     Postoperative Care    Pain management: IV analgesics, Oral pain medications, Multi-modal analgesia.   PONV prophylaxis: Ondansetron (or other 5HT-3), Background Propofol Infusion     Comments:                Malina Saleem MD

## 2023-02-06 ENCOUNTER — HOSPITAL ENCOUNTER (OUTPATIENT)
Dept: CT IMAGING | Facility: CLINIC | Age: 58
Discharge: HOME | End: 2023-02-06
Attending: INTERNAL MEDICINE | Admitting: INTERNAL MEDICINE
Payer: COMMERCIAL

## 2023-02-06 DIAGNOSIS — C69.31 MALIGNANT MELANOMA OF CHOROID OF RIGHT EYE (H): ICD-10-CM

## 2023-02-06 LAB
CREAT BLD-MCNC: 0.7 MG/DL (ref 0.5–1)
GFR SERPL CREATININE-BSD FRML MDRD: >60 ML/MIN/1.73M2

## 2023-02-06 PROCEDURE — 82565 ASSAY OF CREATININE: CPT

## 2023-02-06 PROCEDURE — 250N000011 HC RX IP 250 OP 636: Performed by: INTERNAL MEDICINE

## 2023-02-06 PROCEDURE — 250N000009 HC RX 250: Performed by: INTERNAL MEDICINE

## 2023-02-06 PROCEDURE — 74177 CT ABD & PELVIS W/CONTRAST: CPT

## 2023-02-06 RX ORDER — IOPAMIDOL 755 MG/ML
500 INJECTION, SOLUTION INTRAVASCULAR ONCE
Status: COMPLETED | OUTPATIENT
Start: 2023-02-06 | End: 2023-02-06

## 2023-02-06 RX ADMIN — SODIUM CHLORIDE 65 ML: 9 INJECTION, SOLUTION INTRAVENOUS at 10:17

## 2023-02-06 RX ADMIN — IOPAMIDOL 100 ML: 755 INJECTION, SOLUTION INTRAVENOUS at 10:17

## 2023-02-09 ENCOUNTER — VIRTUAL VISIT (OUTPATIENT)
Dept: ONCOLOGY | Facility: CLINIC | Age: 58
End: 2023-02-09
Attending: INTERNAL MEDICINE
Payer: COMMERCIAL

## 2023-02-09 DIAGNOSIS — C69.31 MALIGNANT MELANOMA OF CHOROID OF RIGHT EYE (H): Primary | ICD-10-CM

## 2023-02-09 PROCEDURE — 99215 OFFICE O/P EST HI 40 MIN: CPT | Mod: VID | Performed by: INTERNAL MEDICINE

## 2023-02-09 PROCEDURE — G0463 HOSPITAL OUTPT CLINIC VISIT: HCPCS | Mod: PN,GT | Performed by: INTERNAL MEDICINE

## 2023-02-09 NOTE — PROGRESS NOTES
Video-Visit Details    Type of service:  Video Visit    Video Start Time (time video started): 4:45 PM    Video End Time (time video stopped): 5:15 PM    Originating Location (pt. Location): Home        Distant Location (provider location):  On-site    Mode of Communication:  Video Conference via [facility]      MEDICAL ONCOLOGY PROGRESS NOTE  Melanoma Clinic  Feb 9, 2023      Chief Complaint: Right eye choroidal melanoma    History of Present Illness:  Cheyenne Caldwell is a 57 year old female with diabetes type 2 and hypertension, and a recent diagnosis of choroidal melanoma of the right eye. She presents via video visit.    8/2/22, patient was initially found to have a choroidal lesion measuring 0.77mmH x 4.37 mmL x 4.50 mmW B-scan.    11/1/22, there is worsening compared to prior study with choroidal lesion measuring 1.87 mmH x 5.86 mmL x 6.60 mmW.    On 1/30/2023, she had plaque brachytherapy placement.Biopsy attempted but could not be completed. Plaque removal on 2/2/23.    Interval History:  She feels well. Denies any complaints.  Meets with Dr. Monroy tomorrow. She had restaging Ct-CAP on 2/6/23 that was negative for evidence of metastatic disease.      Review of Systems:  A 12-point ROS negative except as in HPI    Current Outpatient Medications   Medication Sig Dispense Refill     acetaminophen (TYLENOL) 325 MG tablet Take 325-650 mg by mouth every 6 hours as needed for mild pain       atorvastatin (LIPITOR) 20 MG tablet Take 20 mg by mouth daily       blood glucose (ONETOUCH VERIO IQ) test strip Use  to test daily.       escitalopram (LEXAPRO) 10 MG tablet TAKE 1 AND 1/2 TABLETS BY MOUTH DAILY       ibuprofen (ADVIL/MOTRIN) 200 MG capsule Take 200 mg by mouth every 4 hours as needed for fever       losartan-hydrochlorothiazide (HYZAAR) 100-12.5 MG tablet Take 1 tablet by mouth daily       metFORMIN (GLUCOPHAGE XR) 500 MG 24 hr tablet Take 2,000 mg by mouth daily (with breakfast)       metoprolol succinate  ER (TOPROL XL) 50 MG 24 hr tablet Take 50 mg by mouth daily       TRULICITY 1.5 MG/0.5ML pen ADMINISTER 1.5 MG UNDER THE SKIN 1 TIME A WEEK         Allergies   Allergen Reactions     Penicillins Other (See Comments) and Unknown     swelling       Immunization History   Administered Date(s) Administered     COVID-19 Vaccine 12+ (Pfizer) 03/29/2021, 04/19/2021, 11/30/2021     FLU 6-35 months 01/29/2007     Flu, Unspecified 01/29/2007     HepB-Adult 12/15/2017, 11/20/2018, 09/10/2019     Influenza Vaccine >6 months (Alfuria,Fluzone) 10/16/2013, 10/23/2014, 01/22/2016, 09/18/2017, 11/20/2018, 09/10/2019, 11/30/2021     Influenza Vaccine IM Ages 6-35 Months 4 Valent (PF) 10/16/2013     Pneumococcal 23 valent 09/18/2017     Tdap (Adacel,Boostrix) 01/29/2007, 05/12/2017     Zoster vaccine recombinant adjuvanted (SHINGRIX) 09/10/2019, 12/09/2019       Past Medical History:   Diagnosis Date     Benign essential hypertension      Choroidal malignant melanoma, right (H)      Diabetes mellitus, type 2 (H)      Gastroesophageal reflux disease        Past Surgical History:   Procedure Laterality Date     INSERT PLAQUE RADIOACTIVE Right 1/30/2023    Procedure: Right INSERTION, RADIOACTIVE PLAQUE, BIOPSY OF RIGHT EYE, RIGHT EYE CRYO;  Surgeon: Irwin Monroy MD;  [facility]     REMOVE PLAQUE RADIOACTIVE Bilateral 2/2/2023    Procedure: REMOVAL, RADIOACTIVE PLAQUE, RIGHT EYE;  Surgeon: Irwin Monroy MD;  Location: [facility] OR     TONSILLECTOMY         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 Three children and 2 grandchildren. No smoking.   History   Smoking Status     Never   Smokeless Tobacco     Never    Social History    Substance and Sexual Activity      Alcohol use: Not Currently        Comment: last use 2020     History   Drug Use Unknown       Family History:  Breast cancer, colon cancer, cervical cancer: mother  Breast cancer: maternal grandmother    Physical Examination:  LMP  11/01/2021 (Within Weeks)   Wt Readings from Last 5 Encounters:   02/02/23 100 kg (220 lb 7.4 oz)   01/30/23 101.2 kg (223 lb 1.7 oz)   12/12/22 103.1 kg (227 lb 3.2 oz)     GENERAL: Healthy, alert and no distress  EYES: Eyes grossly normal to inspection.  No discharge or erythema, or obvious scleral/conjunctival abnormalities.  HENT: Normal cephalic/atraumatic.  External ears, nose and mouth without ulcers or lesions.  No nasal drainage visible.  NECK: No asymmetry, visible masses or scars  RESP: No audible wheeze, cough, or visible cyanosis.  No visible retractions or increased work of breathing.    SKIN: Visible skin clear. No significant rash, abnormal pigmentation or lesions.  NEURO: Cranial nerves grossly intact.  Mentation and speech appropriate for age.  PSYCH: Mentation appears normal, affect normal/bright, judgement and insight intact, normal speech and appearance well-groomed.      Laboratory Data:  Hospital Outpatient Visit on 02/06/2023   Component Date Value Ref Range Status     Creatinine POCT 02/06/2023 0.7  0.5 - 1.0 mg/dL Final     GFR, ESTIMATED POCT 02/06/2023 >60  >60 mL/min/1.73m2 Final   Admission on 02/02/2023, Discharged on 02/02/2023   Component Date Value Ref Range Status     GLUCOSE BY METER POCT 02/02/2023 137 (H)  70 - 99 mg/dL Final     GLUCOSE BY METER POCT 02/02/2023 142 (H)  70 - 99 mg/dL Final   Admission on 01/30/2023, Discharged on 01/30/2023   Component Date Value Ref Range Status     GLUCOSE BY METER POCT 01/30/2023 217 (H)  70 - 99 mg/dL Final       Imaging Studies:  CT Chest/Abdomen/Pelvis w Contrast  Narrative: CT CHEST/ABDOMEN/PELVIS WITH CONTRAST 2/6/2023 10:28 AM    CLINICAL HISTORY: Malignant melanoma of choroid of right eye (H).    TECHNIQUE: CT scan of the chest, abdomen, and pelvis was performed  following injection of IV contrast. Multiplanar reformats were  obtained. Dose reduction techniques were used.   CONTRAST: 100 mL Isovue-370    COMPARISON: CT of the chest,  abdomen, and pelvis performed 11/14/2022.    FINDINGS:   LUNGS AND PLEURA: No pleural effusions. No lung masses or  consolidations.    MEDIASTINUM/AXILLAE: No enlarged lymph nodes in the chest. No  pericardial effusion.    CORONARY ARTERY CALCIFICATION: None.    HEPATOBILIARY: Hepatic steatosis. No hepatic masses are seen.    PANCREAS: Normal.    SPLEEN: Normal.    ADRENAL GLANDS: Normal.    KIDNEYS/BLADDER: Unremarkable. No hydronephrosis.    BOWEL: No bowel obstruction. No evidence for colitis or  diverticulitis.     PELVIC ORGANS: Unremarkable.    LYMPH NODES: No enlarged lymph nodes are identified in the abdomen or  pelvis.    VASCULATURE: Unremarkable.    ADDITIONAL FINDINGS: None.    MUSCULOSKELETAL: Unremarkable.  Impression: IMPRESSION:   1.  No evidence for metastatic malignancy in the chest, abdomen, or  pelvis.  2.  Hepatic steatosis.    JAMEL TINSLEY MD         SYSTEM ID:  [id number]      ASSESSMENT/PLAN:    #1 Choroidal melanoma, right eye, (T1a) Stage I    It was a pleasure to see Mrs. Amelia Caldwell today. She is a very pleasant 57 year old woman with a history of a T1a choroidal melanoma of the right eye s/p plaque brachytherapy. She had CT-CAP, which was negative for evidence of metastatic disease.    -RTC in 6 months with Ct-CAP and labs    Chuy Trevino M.D.   of Medicine  Hematology, Oncology and Transplantation  Pager: [phone]

## 2023-02-09 NOTE — NURSING NOTE
Is the patient currently in the state of [place]? YES    Visit mode:VIDEO    If the visit is dropped, the patient can be reconnected by: VIDEO VISIT: Text to cell phone: [phone]    Will anyone else be joining the visit? NO      How would you like to obtain your AVS? MyChart    Are changes needed to the allergy or medication list? NO    Patient denies any changes since echeck-in regarding medication and allergies and states all information entered during echeck-in remains accurate.    Comments or concerns regarding today's visit: None    Clare LYNNE

## 2023-02-09 NOTE — LETTER
2/9/2023         RE: Cheyenne Caldwell  [address]  [place] [place] [address]        Dear Colleague,    Thank you for referring your patient, Cheyenne Caldwell, to the [facility]. Please see a copy of my visit note below.    Video-Visit Details    Type of service:  Video Visit    Video Start Time (time video started): 4:45 PM    Video End Time (time video stopped): 5:15 PM    Originating Location (pt. Location): Home        Distant Location (provider location):  On-site    Mode of Communication:  Video Conference via [facility]      MEDICAL ONCOLOGY PROGRESS NOTE  Melanoma Clinic  Feb 9, 2023      Chief Complaint: Right eye choroidal melanoma    History of Present Illness:  Cheyenne Caldwell is a 57 year old female with diabetes type 2 and hypertension, and a recent diagnosis of choroidal melanoma of the right eye. She presents via video visit.    8/2/22, patient was initially found to have a choroidal lesion measuring 0.77mmH x 4.37 mmL x 4.50 mmW B-scan.    11/1/22, there is worsening compared to prior study with choroidal lesion measuring 1.87 mmH x 5.86 mmL x 6.60 mmW.    On 1/30/2023, she had plaque brachytherapy placement.Biopsy attempted but could not be completed. Plaque removal on 2/2/23.    Interval History:  She feels well. Denies any complaints.  Meets with Dr. Monroy tomorrow. She had restaging Ct-CAP on 2/6/23 that was negative for evidence of metastatic disease.      Review of Systems:  A 12-point ROS negative except as in HPI    Current Outpatient Medications   Medication Sig Dispense Refill     acetaminophen (TYLENOL) 325 MG tablet Take 325-650 mg by mouth every 6 hours as needed for mild pain       atorvastatin (LIPITOR) 20 MG tablet Take 20 mg by mouth daily       blood glucose (ONETOUCH VERIO IQ) test strip Use  to test daily.       escitalopram (LEXAPRO) 10 MG tablet TAKE 1 AND 1/2 TABLETS BY MOUTH DAILY       ibuprofen (ADVIL/MOTRIN) 200 MG capsule Take 200  mg by mouth every 4 hours as needed for fever       losartan-hydrochlorothiazide (HYZAAR) 100-12.5 MG tablet Take 1 tablet by mouth daily       metFORMIN (GLUCOPHAGE XR) 500 MG 24 hr tablet Take 2,000 mg by mouth daily (with breakfast)       metoprolol succinate ER (TOPROL XL) 50 MG 24 hr tablet Take 50 mg by mouth daily       TRULICITY 1.5 MG/0.5ML pen ADMINISTER 1.5 MG UNDER THE SKIN 1 TIME A WEEK         Allergies   Allergen Reactions     Penicillins Other (See Comments) and Unknown     swelling       Immunization History   Administered Date(s) Administered     COVID-19 Vaccine 12+ (Pfizer) 03/29/2021, 04/19/2021, 11/30/2021     FLU 6-35 months 01/29/2007     Flu, Unspecified 01/29/2007     HepB-Adult 12/15/2017, 11/20/2018, 09/10/2019     Influenza Vaccine >6 months (Alfuria,Fluzone) 10/16/2013, 10/23/2014, 01/22/2016, 09/18/2017, 11/20/2018, 09/10/2019, 11/30/2021     Influenza Vaccine IM Ages 6-35 Months 4 Valent (PF) 10/16/2013     Pneumococcal 23 valent 09/18/2017     Tdap (Adacel,Boostrix) 01/29/2007, 05/12/2017     Zoster vaccine recombinant adjuvanted (SHINGRIX) 09/10/2019, 12/09/2019       Past Medical History:   Diagnosis Date     Benign essential hypertension      Choroidal malignant melanoma, right (H)      Diabetes mellitus, type 2 (H)      Gastroesophageal reflux disease        Past Surgical History:   Procedure Laterality Date     INSERT PLAQUE RADIOACTIVE Right 1/30/2023    Procedure: Right INSERTION, RADIOACTIVE PLAQUE, BIOPSY OF RIGHT EYE, RIGHT EYE CRYO;  Surgeon: Irwin Monroy MD;  [facility]     REMOVE PLAQUE RADIOACTIVE Bilateral 2/2/2023    Procedure: REMOVAL, RADIOACTIVE PLAQUE, RIGHT EYE;  Surgeon: Irwin Monroy MD;  [facility]     TONSILLECTOMY         Social History:  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 Three children and 2 grandchildren. No smoking.   History   Smoking Status     Never   Smokeless Tobacco     Never    Social History     Substance and Sexual Activity      Alcohol use: Not Currently        Comment: last use 2020     History   Drug Use Unknown       Family History:  Breast cancer, colon cancer, cervical cancer: mother  Breast cancer: maternal grandmother    Physical Examination:  LMP 11/01/2021 (Within Weeks)   Wt Readings from Last 5 Encounters:   02/02/23 100 kg (220 lb 7.4 oz)   01/30/23 101.2 kg (223 lb 1.7 oz)   12/12/22 103.1 kg (227 lb 3.2 oz)     GENERAL: Healthy, alert and no distress  EYES: Eyes grossly normal to inspection.  No discharge or erythema, or obvious scleral/conjunctival abnormalities.  HENT: Normal cephalic/atraumatic.  External ears, nose and mouth without ulcers or lesions.  No nasal drainage visible.  NECK: No asymmetry, visible masses or scars  RESP: No audible wheeze, cough, or visible cyanosis.  No visible retractions or increased work of breathing.    SKIN: Visible skin clear. No significant rash, abnormal pigmentation or lesions.  NEURO: Cranial nerves grossly intact.  Mentation and speech appropriate for age.  PSYCH: Mentation appears normal, affect normal/bright, judgement and insight intact, normal speech and appearance well-groomed.      Laboratory Data:  Hospital Outpatient Visit on 02/06/2023   Component Date Value Ref Range Status     Creatinine POCT 02/06/2023 0.7  0.5 - 1.0 mg/dL Final     GFR, ESTIMATED POCT 02/06/2023 >60  >60 mL/min/1.73m2 Final   Admission on 02/02/2023, Discharged on 02/02/2023   Component Date Value Ref Range Status     GLUCOSE BY METER POCT 02/02/2023 137 (H)  70 - 99 mg/dL Final     GLUCOSE BY METER POCT 02/02/2023 142 (H)  70 - 99 mg/dL Final   Admission on 01/30/2023, Discharged on 01/30/2023   Component Date Value Ref Range Status     GLUCOSE BY METER POCT 01/30/2023 217 (H)  70 - 99 mg/dL Final       Imaging Studies:  CT Chest/Abdomen/Pelvis w Contrast  Narrative: CT CHEST/ABDOMEN/PELVIS WITH CONTRAST 2/6/2023 10:28 AM    CLINICAL HISTORY: Malignant melanoma of  choroid of right eye (H).    TECHNIQUE: CT scan of the chest, abdomen, and pelvis was performed  following injection of IV contrast. Multiplanar reformats were  obtained. Dose reduction techniques were used.   CONTRAST: 100 mL Isovue-370    COMPARISON: CT of the chest, abdomen, and pelvis performed 11/14/2022.    FINDINGS:   LUNGS AND PLEURA: No pleural effusions. No lung masses or  consolidations.    MEDIASTINUM/AXILLAE: No enlarged lymph nodes in the chest. No  pericardial effusion.    CORONARY ARTERY CALCIFICATION: None.    HEPATOBILIARY: Hepatic steatosis. No hepatic masses are seen.    PANCREAS: Normal.    SPLEEN: Normal.    ADRENAL GLANDS: Normal.    KIDNEYS/BLADDER: Unremarkable. No hydronephrosis.    BOWEL: No bowel obstruction. No evidence for colitis or  diverticulitis.     PELVIC ORGANS: Unremarkable.    LYMPH NODES: No enlarged lymph nodes are identified in the abdomen or  pelvis.    VASCULATURE: Unremarkable.    ADDITIONAL FINDINGS: None.    MUSCULOSKELETAL: Unremarkable.  Impression: IMPRESSION:   1.  No evidence for metastatic malignancy in the chest, abdomen, or  pelvis.  2.  Hepatic steatosis.    JAMEL TINSLEY MD         SYSTEM ID:  [id number]      ASSESSMENT/PLAN:    #1 Choroidal melanoma, right eye, (T1a) Stage I    It was a pleasure to see Mrs. Amelia Caldwell today. She is a very pleasant 57 year old woman with a history of a T1a choroidal melanoma of the right eye s/p plaque brachytherapy. She had CT-CAP, which was negative for evidence of metastatic disease.    -RTC in 6 months with Ct-CAP and labs    Chuy Trevino M.D.   of Medicine  Hematology, Oncology and Transplantation  Pager: [phone]

## 2023-03-20 ENCOUNTER — ONCOLOGY VISIT (OUTPATIENT)
Dept: RADIATION ONCOLOGY | Facility: CLINIC | Age: 58
End: 2023-03-20
Payer: COMMERCIAL

## 2023-03-20 NOTE — LETTER
3/20/2023         RE: Cheyenne Caldwell  [address]  [place] [address]        Dear Colleague,    Thank you for referring your patient, Cheyenne Caldwell, to the [facility] RADIATION ONCOLOGY. Please see a copy of my visit note below.    No notes on file    Again, thank you for allowing me to participate in the care of your patient.        Sincerely,        Monet Copeland MD

## 2023-03-27 ENCOUNTER — ONCOLOGY VISIT (OUTPATIENT)
Dept: RADIATION ONCOLOGY | Facility: CLINIC | Age: 58
End: 2023-03-27
Payer: COMMERCIAL

## 2023-03-27 ENCOUNTER — DOCUMENTATION ONLY (OUTPATIENT)
Dept: RADIATION ONCOLOGY | Facility: CLINIC | Age: 58
End: 2023-03-27
Payer: COMMERCIAL

## 2023-03-27 NOTE — PROGRESS NOTES
Radiotherapy Treatment Summary          Date of Report: 2023      PATIENT: CHEYENNE CALDWELL  MEDICAL RECORD NO: [id number]    : 1965    DIAGNOSIS: C69.3 Malignant neoplasm of choroid    INTENT OF RADIOTHERAPY: Cure    Details of the treatments summarized below are found in records kept in the Department of Radiation Oncology @ [facility].    Treatment Summary:  Radiation Oncology - Course: 1 Protocol:   Treatment Site Current Dose Modality From To Elapsed Days Fx.  1 Right choroid  8,500 cGy Iodine-125  2023   3   1               Dose per Fraction: 8500cGy  Total Dose:   8500cGy    COMMENTS: Cheyenne Caldwell  underwent a radioactive eye plaque of the right eye by Dr. Irwin Monroy for choroidal melanoma.  The patient tolerated the treatment well and will be followed by Dr. Monroy.      Staff Physician  Monet Copeland M.D.    CC:   Irwin Monroy MD

## 2023-03-27 NOTE — LETTER
3/27/2023         RE: Cheyenne Caldwell  [address]  [place] [facility] [address]        Dear Colleague,    Thank you for referring your patient, Cheyenne Caldwell, to the [facility] RADIATION ONCOLOGY. Please see a copy of my visit note below.    No notes on file    Again, thank you for allowing me to participate in the care of your patient.        Sincerely,        Monet Copeland MD

## 2023-03-28 NOTE — PROCEDURES
Radiotherapy Treatment Summary          Date of Report: 2023             PATIENT: CHEYENNE CALDWELL  MEDICAL RECORD NO: [id number]    : 1965     DIAGNOSIS: C69.3 Malignant neoplasm of choroid     INTENT OF RADIOTHERAPY: Cure     Details of the treatments summarized below are found in records kept in the Department of Radiation Oncology @ [facility].     Treatment Summary:  Radiation Oncology - Course: 1 Protocol:   Treatment Site Current Dose Modality From To Elapsed Days Fx.  1 Right choroid  8,500 cGy Iodine-125  2023   3   1                 Dose per Fraction: 8500cGy  Total Dose:   8500cGy     COMMENTS: Cheyenne Caldwell  underwent a radioactive eye plaque of the right eye by Dr. Irwin Monroy for choroidal melanoma.  The patient tolerated the treatment well and will be followed by Dr. Monroy.        Staff Physician  Monet Copeland M.D.     CC:   Irwin Monroy MD           Radiation Oncology:  [facility] [address] [place] [address]

## 2023-03-28 NOTE — PROCEDURES
Radiotherapy Treatment Summary          Date of Report: 2023             PATIENT: CHEYENNE CALDWELL  MEDICAL RECORD NO: [id number]    : 1965     DIAGNOSIS: C69.3 Malignant neoplasm of choroid     INTENT OF RADIOTHERAPY: Cure     Details of the treatments summarized below are found in records kept in the Department of Radiation Oncology @ [facility].     Treatment Summary:  Radiation Oncology - Course: 1 Protocol:   Treatment Site Current Dose Modality From To Elapsed Days Fx.  1 Right choroid  8,500 cGy Iodine-125  2023   3   1                 Dose per Fraction: 8500cGy  Total Dose:   8500cGy     COMMENTS: Cheyenne Caldwell  underwent a radioactive eye plaque of the right eye by Dr. Irwin Monroy for choroidal melanoma.  The patient tolerated the treatment well and will be followed by Dr. Monroy.        Staff Physician  Monet Copeland M.D.     CC:   Irwin Monroy MD           Radiation Oncology:  [facility] [address] [place] [address]

## 2023-04-01 ENCOUNTER — HEALTH MAINTENANCE LETTER (OUTPATIENT)
Age: 58
End: 2023-04-01

## 2023-07-18 ENCOUNTER — TRANSFERRED RECORDS (OUTPATIENT)
Dept: HEALTH INFORMATION MANAGEMENT | Facility: CLINIC | Age: 58
End: 2023-07-18
Payer: COMMERCIAL

## 2023-07-18 LAB — RETINOPATHY: NEGATIVE

## 2023-08-27 ENCOUNTER — HEALTH MAINTENANCE LETTER (OUTPATIENT)
Age: 58
End: 2023-08-27

## 2023-09-18 ENCOUNTER — HOSPITAL ENCOUNTER (OUTPATIENT)
Dept: CT IMAGING | Facility: CLINIC | Age: 58
Discharge: HOME | End: 2023-09-18
Attending: INTERNAL MEDICINE | Admitting: INTERNAL MEDICINE
Payer: COMMERCIAL

## 2023-09-18 DIAGNOSIS — C69.31 MALIGNANT MELANOMA OF CHOROID OF RIGHT EYE (H): ICD-10-CM

## 2023-09-18 PROCEDURE — 250N000011 HC RX IP 250 OP 636: Performed by: INTERNAL MEDICINE

## 2023-09-18 PROCEDURE — 250N000009 HC RX 250: Performed by: INTERNAL MEDICINE

## 2023-09-18 PROCEDURE — 74177 CT ABD & PELVIS W/CONTRAST: CPT

## 2023-09-18 RX ORDER — IOPAMIDOL 755 MG/ML
108 INJECTION, SOLUTION INTRAVASCULAR ONCE
Status: COMPLETED | OUTPATIENT
Start: 2023-09-18 | End: 2023-09-18

## 2023-09-18 RX ADMIN — SODIUM CHLORIDE 71 ML: 9 INJECTION, SOLUTION INTRAVENOUS at 08:09

## 2023-09-18 RX ADMIN — IOPAMIDOL 108 ML: 755 INJECTION, SOLUTION INTRAVENOUS at 08:09

## 2023-09-21 ENCOUNTER — VIRTUAL VISIT (OUTPATIENT)
Dept: ONCOLOGY | Facility: CLINIC | Age: 58
End: 2023-09-21
Attending: INTERNAL MEDICINE
Payer: COMMERCIAL

## 2023-09-21 VITALS — BODY MASS INDEX: 32.1 KG/M2 | HEIGHT: 64 IN | WEIGHT: 188 LBS

## 2023-09-21 DIAGNOSIS — R93.89 THICKENED ENDOMETRIUM: Primary | ICD-10-CM

## 2023-09-21 PROBLEM — C69.31: Status: ACTIVE | Noted: 2023-01-24

## 2023-09-21 PROCEDURE — 99214 OFFICE O/P EST MOD 30 MIN: CPT | Mod: VID | Performed by: INTERNAL MEDICINE

## 2023-09-21 ASSESSMENT — PAIN SCALES - GENERAL: PAINLEVEL: NO PAIN (0)

## 2023-09-21 NOTE — LETTER
9/21/2023         RE: Cheyenne Caldwell  [address]  [facility] [address]        Dear Colleague,    Thank you for referring your patient, Cheyenne Caldwell, to the [facility]. Please see a copy of my visit note below.    Is the patient currently in the state of [place]? YES    Visit mode:VIDEO    If the visit is dropped, the patient can be reconnected by: VIDEO VISIT: Text to cell phone:   Telephone Information:   Mobile [phone]       Will anyone else be joining the visit? NO  (If patient encounters technical issues they should call [phone])    How would you like to obtain your AVS? MyChart    Are changes needed to the allergy or medication list? Pt stated no med changes    Reason for visit: RECHECK    Melva ARNETT       Video-Visit Details    Type of service:  Video Visit    Video Visit Start Time:9:15 AM    Video End Time:9:30 AM    Originating Location (pt. Location): Home      Distant Location (provider location):  Off-site     Platform used for Video Visit: [facility] ONCOLOGY PROGRESS NOTE  Melanoma Clinic  Sep 21, 2023      Chief Complaint: Right eye choroidal melanoma    History of Present Illness:  Cheyenne Caldwell is a 58 year old female with diabetes type 2 and hypertension, and a recent diagnosis of choroidal melanoma of the right eye. She presents via video visit.    8/2/22, patient was initially found to have a choroidal lesion measuring 0.77mmH x 4.37 mmL x 4.50 mmW B-scan.    11/1/22, there is worsening compared to prior study with choroidal lesion measuring 1.87 mmH x 5.86 mmL x 6.60 mmW.    On 1/30/2023, she had plaque brachytherapy placement. Biopsy attempted but could not be completed. Plaque removal on 2/2/23.    Interval History:  She feels well. Denies any complaints.  Things are going well. Eye tumor is making some progress. She will visit with Dr. Monroy next month.      She had restaging Ct-CAP, which showed a thickened  endometrium to 1.2 cm, but was otherwise negative for evidence of metastatic disease.    She's had some nausea, but that seems more related to changes in diabetes medications. Trulicity stopped working so she is now on Mounjaro. Nausea is getting better the longer she is on it. She also has had some dysuria, like she is going to have a UTI, but it never seems to turn into one. She denies any abdominal or pelvic pain or vaginal bleeding.         Current Outpatient Medications   Medication Sig Dispense Refill    atorvastatin (LIPITOR) 20 MG tablet Take 20 mg by mouth daily      escitalopram (LEXAPRO) 10 MG tablet TAKE 1 AND 1/2 TABLETS BY MOUTH DAILY      losartan-hydrochlorothiazide (HYZAAR) 100-12.5 MG tablet Take 1 tablet by mouth daily      metFORMIN (GLUCOPHAGE XR) 500 MG 24 hr tablet Take 2,000 mg by mouth daily (with breakfast)      metoprolol succinate ER (TOPROL XL) 50 MG 24 hr tablet Take 50 mg by mouth daily      acetaminophen (TYLENOL) 325 MG tablet Take 325-650 mg by mouth every 6 hours as needed for mild pain      blood glucose (ONETOUCH VERIO IQ) test strip Use  to test daily.      ibuprofen (ADVIL/MOTRIN) 200 MG capsule Take 200 mg by mouth every 4 hours as needed for fever      TRULICITY 1.5 MG/0.5ML pen ADMINISTER 1.5 MG UNDER THE SKIN 1 TIME A WEEK         Allergies   Allergen Reactions    Penicillins Other (See Comments) and Unknown     swelling       Immunization History   Administered Date(s) Administered    COVID-19 MONOVALENT 12+ (Pfizer) 03/29/2021, 04/19/2021, 11/30/2021    FLU 6-35 months 01/29/2007    Flu, Unspecified 01/29/2007    Hepatitis B, Adult 12/15/2017, 11/20/2018, 09/10/2019    Influenza Vaccine >6 months (Alfuria,Fluzone) 10/16/2013, 10/23/2014, 01/22/2016, 09/18/2017, 11/20/2018, 09/10/2019, 11/30/2021    Influenza Vaccine IM Ages 6-35 Months 4 Valent (PF) 10/16/2013    Pneumococcal 23 valent 09/18/2017    TDAP (Adacel,Boostrix) 01/29/2007, 05/12/2017    Zoster recombinant  "adjuvanted (SHINGRIX) 09/10/2019, 12/09/2019       Past Medical History:   Diagnosis Date    Benign essential hypertension     Choroidal malignant melanoma, right (H)     Diabetes mellitus, type 2 (H)     Gastroesophageal reflux disease        Past Surgical History:   Procedure Laterality Date    INSERT PLAQUE RADIOACTIVE Right 1/30/2023    Procedure: Right INSERTION, RADIOACTIVE PLAQUE, BIOPSY OF RIGHT EYE, RIGHT EYE CRYO;  Surgeon: Irwin Monroy MD;  Location: [facility] OR    REMOVE PLAQUE RADIOACTIVE Bilateral 2/2/2023    Procedure: REMOVAL, RADIOACTIVE PLAQUE, RIGHT EYE;  Surgeon: Irwin Monroy MD;  Location: [facility] OR    TONSILLECTOMY         Social History:  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 Three children and 2 grandchildren. No smoking.   History   Smoking Status    Never   Smokeless Tobacco    Never    Social History    Substance and Sexual Activity      Alcohol use: Not Currently        Comment: last use 2020     History   Drug Use Unknown     Physical Examination:  Ht 1.626 m (5' 4\")   Wt 85.3 kg (188 lb)   LMP 11/01/2021 (Within Weeks)   BMI 32.27 kg/m    Wt Readings from Last 5 Encounters:   09/21/23 85.3 kg (188 lb)   02/02/23 100 kg (220 lb 7.4 oz)   01/30/23 101.2 kg (223 lb 1.7 oz)   12/12/22 103.1 kg (227 lb 3.2 oz)     GENERAL: Healthy, alert and no distress  EYES: Eyes grossly normal to inspection.  No discharge or erythema, or obvious scleral/conjunctival abnormalities.  HENT: Normal cephalic/atraumatic.  External ears, nose and mouth without ulcers or lesions.  No nasal drainage visible.  NECK: No asymmetry, visible masses or scars  RESP: No audible wheeze, cough, or visible cyanosis.  No visible retractions or increased work of breathing.    SKIN: Visible skin clear. No significant rash, abnormal pigmentation or lesions.  NEURO: Cranial nerves grossly intact.  Mentation and speech appropriate for age.  PSYCH: Mentation appears normal, affect normal/bright, " judgement and insight intact, normal speech and appearance well-groomed.      Laboratory Data:  No visits with results within 3 Week(s) from this visit.   Latest known visit with results is:   Hospital Outpatient Visit on 02/06/2023   Component Date Value Ref Range Status    Creatinine POCT 02/06/2023 0.7  0.5 - 1.0 mg/dL Final    GFR, ESTIMATED POCT 02/06/2023 >60  >60 mL/min/1.73m2 Final   I personally reviewed the above labs.    Imaging Studies:  CT Chest/Abdomen/Pelvis w Contrast  Narrative: CT CHEST/ABDOMEN/PELVIS WITH CONTRAST 9/18/2023 8:26 AM    CLINICAL HISTORY: Malignant melanoma of choroid of right eye (H).    TECHNIQUE: CT scan of the chest, abdomen, and pelvis was performed  following injection of IV contrast. Multiplanar reformats were  obtained. Dose reduction techniques were used.   CONTRAST: 108 mL Isovue-370    COMPARISON: CT chest, abdomen and pelvis 2/6/2023, 11/14/2022.    FINDINGS:   LOWER NECK: Unremarkable.    LUNGS AND PLEURA: No focal consolidation or pleural effusion. Similar  tiny right upper lobe 2 mm nodule (5/89), and lingular 2 mm nodule  (5/218). No new or growing suspicious pulmonary nodule.    AIRWAYS: Patent.    HEART: No pericardial effusion.  No coronary calcifications. No  thoracic aortic aneurysm.    PULMONARY ARTERIES: Unremarkable.    MEDIASTINUM AND SAIGE: Unremarkable.    HEPATOBILIARY: Unremarkable. Gallbladder is present without  gallstones.    SPLEEN: Unremarkable.    PANCREAS: Unremarkable.    ADRENAL GLANDS: Unremarkable.    KIDNEYS/URETERS/BLADDER: Unremarkable.    BOWEL: No bowel dilatation or wall thickening.    LYMPH NODES AND PERITONEUM: Unremarkable.    VASCULATURE: Nonaneurysmal abdominal aorta.    PELVIS: Heterogeneous thickening of the endometrium (1.2 cm; 8/71). No  adnexal mass.    MUSCULOSKELETAL: No aggressive osseous lesion.    ADDITIONAL FINDINGS: None.  Impression: IMPRESSION:  1.  No convincing metastatic disease within the chest, abdomen  or  pelvis.  2.  Heterogeneous thickening of the endometrium (1.2 cm). Recommend  dedicated pelvic ultrasound and/or tissue sampling for further  evaluation.    PAMELA VASQUEZ MD         SYSTEM ID:  [id number]  I personally reviewed the above imaging.    ASSESSMENT/PLAN:    #1 Choroidal melanoma, right eye, (T1a) Stage I  It was a pleasure to see Mrs. Amelia Caldwell today. She is a very pleasant 57 year old woman with a history of a T1a choroidal melanoma of the right eye s/p plaque brachytherapy. She had CT-CAP, which was negative for evidence of metastatic disease.  -RTC in 6-12 months with Ct-CAP and labs, patient to tell us which she prefers next visit    #2 Endometrial thickening  On CT-scan there is some heterogeneous thickening of the endometrium. We will obtain dedicated pelvic ultrasound for further evaluation.  -pelvic ultrasound and return with return to see Senait Frye in follow-up.    Chuy Trevino M.D.   of Medicine  Hematology, Oncology and Transplantation  Pager: [phone]

## 2023-09-21 NOTE — PROGRESS NOTES
Is the patient currently in the state of [place]? YES    Visit mode:VIDEO    If the visit is dropped, the patient can be reconnected by: VIDEO VISIT: Text to cell phone:   Telephone Information:   Mobile [phone]       Will anyone else be joining the visit? NO  (If patient encounters technical issues they should call [phone])    How would you like to obtain your AVS? MyChart    Are changes needed to the allergy or medication list? Pt stated no med changes    Reason for visit: RECHECK    Melva ARNETT       Video-Visit Details    Type of service:  Video Visit    Video Visit Start Time:9:15 AM    Video End Time:9:30 AM    Originating Location (pt. Location): Home      Distant Location (provider location):  Off-site     Platform used for Video Visit: [facility] ONCOLOGY PROGRESS NOTE  Melanoma Clinic  Sep 21, 2023      Chief Complaint: Right eye choroidal melanoma    History of Present Illness:  Cheyenne Caldwell is a 58 year old female with diabetes type 2 and hypertension, and a recent diagnosis of choroidal melanoma of the right eye. She presents via video visit.    8/2/22, patient was initially found to have a choroidal lesion measuring 0.77mmH x 4.37 mmL x 4.50 mmW B-scan.    11/1/22, there is worsening compared to prior study with choroidal lesion measuring 1.87 mmH x 5.86 mmL x 6.60 mmW.    On 1/30/2023, she had plaque brachytherapy placement. Biopsy attempted but could not be completed. Plaque removal on 2/2/23.    Interval History:  She feels well. Denies any complaints.  Things are going well. Eye tumor is making some progress. She will visit with Dr. Monroy next month.      She had restaging Ct-CAP, which showed a thickened endometrium to 1.2 cm, but was otherwise negative for evidence of metastatic disease.    She's had some nausea, but that seems more related to changes in diabetes medications. Trulicity stopped working so she is now on Mounjaro. Nausea is getting better the longer  she is on it. She also has had some dysuria, like she is going to have a UTI, but it never seems to turn into one. She denies any abdominal or pelvic pain or vaginal bleeding.         Current Outpatient Medications   Medication Sig Dispense Refill     atorvastatin (LIPITOR) 20 MG tablet Take 20 mg by mouth daily       escitalopram (LEXAPRO) 10 MG tablet TAKE 1 AND 1/2 TABLETS BY MOUTH DAILY       losartan-hydrochlorothiazide (HYZAAR) 100-12.5 MG tablet Take 1 tablet by mouth daily       metFORMIN (GLUCOPHAGE XR) 500 MG 24 hr tablet Take 2,000 mg by mouth daily (with breakfast)       metoprolol succinate ER (TOPROL XL) 50 MG 24 hr tablet Take 50 mg by mouth daily       acetaminophen (TYLENOL) 325 MG tablet Take 325-650 mg by mouth every 6 hours as needed for mild pain       blood glucose (ONETOUCH VERIO IQ) test strip Use  to test daily.       ibuprofen (ADVIL/MOTRIN) 200 MG capsule Take 200 mg by mouth every 4 hours as needed for fever       TRULICITY 1.5 MG/0.5ML pen ADMINISTER 1.5 MG UNDER THE SKIN 1 TIME A WEEK         Allergies   Allergen Reactions     Penicillins Other (See Comments) and Unknown     swelling       Immunization History   Administered Date(s) Administered     COVID-19 MONOVALENT 12+ (Pfizer) 03/29/2021, 04/19/2021, 11/30/2021     FLU 6-35 months 01/29/2007     Flu, Unspecified 01/29/2007     Hepatitis B, Adult 12/15/2017, 11/20/2018, 09/10/2019     Influenza Vaccine >6 months (Alfuria,Fluzone) 10/16/2013, 10/23/2014, 01/22/2016, 09/18/2017, 11/20/2018, 09/10/2019, 11/30/2021     Influenza Vaccine IM Ages 6-35 Months 4 Valent (PF) 10/16/2013     Pneumococcal 23 valent 09/18/2017     TDAP (Adacel,Boostrix) 01/29/2007, 05/12/2017     Zoster recombinant adjuvanted (SHINGRIX) 09/10/2019, 12/09/2019       Past Medical History:   Diagnosis Date     Benign essential hypertension      Choroidal malignant melanoma, right (H)      Diabetes mellitus, type 2 (H)      Gastroesophageal reflux disease   "      Past Surgical History:   Procedure Laterality Date     INSERT PLAQUE RADIOACTIVE Right 1/30/2023    Procedure: Right INSERTION, RADIOACTIVE PLAQUE, BIOPSY OF RIGHT EYE, RIGHT EYE CRYO;  Surgeon: Irwin Monroy MD;  [facility] OR     REMOVE PLAQUE RADIOACTIVE Bilateral 2/2/2023    Procedure: REMOVAL, RADIOACTIVE PLAQUE, RIGHT EYE;  Surgeon: Irwin Monroy MD;  Location: [facility] OR     TONSILLECTOMY         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 Three children and 2 grandchildren. No smoking.   History   Smoking Status     Never   Smokeless Tobacco     Never    Social History    Substance and Sexual Activity      Alcohol use: Not Currently        Comment: last use 2020     History   Drug Use Unknown     Physical Examination:  Ht 1.626 m (5' 4\")   Wt 85.3 kg (188 lb)   LMP 11/01/2021 (Within Weeks)   BMI 32.27 kg/m    Wt Readings from Last 5 Encounters:   09/21/23 85.3 kg (188 lb)   02/02/23 100 kg (220 lb 7.4 oz)   01/30/23 101.2 kg (223 lb 1.7 oz)   12/12/22 103.1 kg (227 lb 3.2 oz)     GENERAL: Healthy, alert and no distress  EYES: Eyes grossly normal to inspection.  No discharge or erythema, or obvious scleral/conjunctival abnormalities.  HENT: Normal cephalic/atraumatic.  External ears, nose and mouth without ulcers or lesions.  No nasal drainage visible.  NECK: No asymmetry, visible masses or scars  RESP: No audible wheeze, cough, or visible cyanosis.  No visible retractions or increased work of breathing.    SKIN: Visible skin clear. No significant rash, abnormal pigmentation or lesions.  NEURO: Cranial nerves grossly intact.  Mentation and speech appropriate for age.  PSYCH: Mentation appears normal, affect normal/bright, judgement and insight intact, normal speech and appearance well-groomed.      Laboratory Data:  No visits with results within 3 Week(s) from this visit.   Latest known visit with results is:   Hospital Outpatient Visit on 02/06/2023 "   Component Date Value Ref Range Status     Creatinine POCT 02/06/2023 0.7  0.5 - 1.0 mg/dL Final     GFR, ESTIMATED POCT 02/06/2023 >60  >60 mL/min/1.73m2 Final   I personally reviewed the above labs.    Imaging Studies:  CT Chest/Abdomen/Pelvis w Contrast  Narrative: CT CHEST/ABDOMEN/PELVIS WITH CONTRAST 9/18/2023 8:26 AM    CLINICAL HISTORY: Malignant melanoma of choroid of right eye (H).    TECHNIQUE: CT scan of the chest, abdomen, and pelvis was performed  following injection of IV contrast. Multiplanar reformats were  obtained. Dose reduction techniques were used.   CONTRAST: 108 mL Isovue-370    COMPARISON: CT chest, abdomen and pelvis 2/6/2023, 11/14/2022.    FINDINGS:   LOWER NECK: Unremarkable.    LUNGS AND PLEURA: No focal consolidation or pleural effusion. Similar  tiny right upper lobe 2 mm nodule (5/89), and lingular 2 mm nodule  (5/218). No new or growing suspicious pulmonary nodule.    AIRWAYS: Patent.    HEART: No pericardial effusion.  No coronary calcifications. No  thoracic aortic aneurysm.    PULMONARY ARTERIES: Unremarkable.    MEDIASTINUM AND SAIGE: Unremarkable.    HEPATOBILIARY: Unremarkable. Gallbladder is present without  gallstones.    SPLEEN: Unremarkable.    PANCREAS: Unremarkable.    ADRENAL GLANDS: Unremarkable.    KIDNEYS/URETERS/BLADDER: Unremarkable.    BOWEL: No bowel dilatation or wall thickening.    LYMPH NODES AND PERITONEUM: Unremarkable.    VASCULATURE: Nonaneurysmal abdominal aorta.    PELVIS: Heterogeneous thickening of the endometrium (1.2 cm; 8/71). No  adnexal mass.    MUSCULOSKELETAL: No aggressive osseous lesion.    ADDITIONAL FINDINGS: None.  Impression: IMPRESSION:  1.  No convincing metastatic disease within the chest, abdomen or  pelvis.  2.  Heterogeneous thickening of the endometrium (1.2 cm). Recommend  dedicated pelvic ultrasound and/or tissue sampling for further  evaluation.    PAMELA VASQUEZ MD         SYSTEM ID:  [id number]  I personally reviewed the  above imaging.    ASSESSMENT/PLAN:    #1 Choroidal melanoma, right eye, (T1a) Stage I  It was a pleasure to see Mrs. Amelia Caldwell today. She is a very pleasant 57 year old woman with a history of a T1a choroidal melanoma of the right eye s/p plaque brachytherapy. She had CT-CAP, which was negative for evidence of metastatic disease.  -RTC in 6-12 months with Ct-CAP and labs, patient to tell us which she prefers next visit    #2 Endometrial thickening  On CT-scan there is some heterogeneous thickening of the endometrium. We will obtain dedicated pelvic ultrasound for further evaluation.  -pelvic ultrasound and return with return to see Senait Frye in follow-up.    Chuy Trevino M.D.   of Medicine  Hematology, Oncology and Transplantation  Pager: [phone]

## 2023-10-04 ENCOUNTER — HOSPITAL ENCOUNTER (OUTPATIENT)
Dept: ULTRASOUND IMAGING | Facility: CLINIC | Age: 58
Discharge: HOME | End: 2023-10-04
Attending: INTERNAL MEDICINE | Admitting: INTERNAL MEDICINE
Payer: COMMERCIAL

## 2023-10-04 DIAGNOSIS — R93.89 THICKENED ENDOMETRIUM: ICD-10-CM

## 2023-10-04 PROCEDURE — 76830 TRANSVAGINAL US NON-OB: CPT

## 2023-10-06 ENCOUNTER — VIRTUAL VISIT (OUTPATIENT)
Dept: ONCOLOGY | Facility: CLINIC | Age: 58
End: 2023-10-06
Attending: PHYSICIAN ASSISTANT
Payer: COMMERCIAL

## 2023-10-06 VITALS — WEIGHT: 185 LBS | HEIGHT: 64 IN | BODY MASS INDEX: 31.58 KG/M2

## 2023-10-06 DIAGNOSIS — C69.31 MALIGNANT MELANOMA OF CHOROID OF RIGHT EYE (H): Primary | ICD-10-CM

## 2023-10-06 PROCEDURE — 99214 OFFICE O/P EST MOD 30 MIN: CPT | Mod: VID | Performed by: PHYSICIAN ASSISTANT

## 2023-10-06 RX ORDER — TIRZEPATIDE 2.5 MG/.5ML
INJECTION, SOLUTION SUBCUTANEOUS
COMMUNITY
Start: 2023-05-09

## 2023-10-06 ASSESSMENT — PAIN SCALES - GENERAL: PAINLEVEL: NO PAIN (0)

## 2023-10-06 NOTE — LETTER
10/6/2023         RE: Cheeynne Caldwell  [address]  [place] [address]        Dear Colleague,    Thank you for referring your patient, Cheyenne Caldwell, to the [facility]. Please see a copy of my visit note below.    Virtual Visit Details    Type of service:  Video Visit     Originating Location (pt. Location): Home  Distant Location (provider location):  On-site  Platform used for Video Visit: AmWell    Is the patient currently in the state of [place]? YES    Visit mode:VIDEO    If the visit is dropped, the patient can be reconnected by: VIDEO VISIT: Text to cell phone:   Telephone Information:   Mobile [phone]       Will anyone else be joining the visit? NO  (If patient encounters technical issues they should call [phone]    How would you like to obtain your AVS? MyChart    Are changes needed to the allergy or medication list? Pt stated no med changes    Reason for visit: RECHECK    Melva ARNETT      Video Visit Start Time: 8:21 am  Video End Time: 8:30 am      MEDICAL ONCOLOGY PROGRESS NOTE  Melanoma Clinic  Oct 6, 2023      Chief Complaint: Right eye choroidal melanoma    History of Present Illness:  Cheyenne Caldwell is a 58 year old female with diabetes type 2 and hypertension, and a recent diagnosis of choroidal melanoma of the right eye. She presents via video visit.    8/2/22, patient was initially found to have a choroidal lesion measuring 0.77mmH x 4.37 mmL x 4.50 mmW B-scan.    11/1/22, there is worsening compared to prior study with choroidal lesion measuring 1.87 mmH x 5.86 mmL x 6.60 mmW.    On 1/30/2023, she had plaque brachytherapy placement. Biopsy attempted but could not be completed. Plaque removal on 2/2/23.    Interval History:  -Denies vaginal bleeding.  -Has had intermittent suprapubic pressure, improved with Advil. Pressure has been occurring over the last few months.   -Pressure has been occurring about once/week.   -Denies  abdominal pain.   -Denies any current back pain.  -Energy is doing well.   -Works full time in advertising.     Current Outpatient Medications   Medication Sig Dispense Refill    MOUNJARO 2.5 MG/0.5ML pen ADMINISTER 2.5MG UNDER THE SKIN 1 TIME A WEEK      acetaminophen (TYLENOL) 325 MG tablet Take 325-650 mg by mouth every 6 hours as needed for mild pain      atorvastatin (LIPITOR) 20 MG tablet Take 20 mg by mouth daily      blood glucose (ONETOUCH VERIO IQ) test strip Use  to test daily.      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 Three children and 2 grandchildren. No smoking.     Objective:  General: patient appears well in no acute distress, alert and oriented, speech clear and fluid  Skin: no visualized rash or lesions on visualized skin  Resp: Appears to be breathing comfortably without accessory muscle usage, speaking in full sentences, no audible wheezes or cough.  Psych: Coherent speech, normal rate and volume, able to articulate logical thoughts, able to abstract reason, no tangential thoughts, no hallucinations or delusions  Patient's affect is appropriate.    Imaging Studies:  US Pelvic Complete with Transvaginal  Narrative: US PELVIC TRANSABDOMINAL AND TRANSVAGINAL 10/4/2023 3:37 PM    CLINICAL HISTORY: thickened endometrium on prior CT  TECHNIQUE: Transabdominal scans were performed. Endovaginal ultrasound  was performed to  better visualize the adnexa.    COMPARISON: CT 9/18/2023    FINDINGS:    UTERUS: 8.7 x 4.7 x 4.2 cm. Normal in size and position. Intramural  uterine fibroids noted, largest in the posterior uterine body  measuring up to 1.4 cm. Small nabothian cysts in the cervix.    ENDOMETRIUM: 10 mm. Thickened but smooth endometrium. No internal flow  visualized.    RIGHT OVARY: 2.1 x 1.5 x 1.4 cm. Normal with flow demonstrated.    LEFT OVARY: 2.2 x 1.5 x 1.4 cm. Normal with flow demonstrated.    No significant free fluid.  Impression: IMPRESSION:  1.  Thickened endometrium for a postmenopausal patient. Could consider  sampling, particularly if patient is experiencing vaginal bleeding.  2.  Small uterine fibroids.    ADELA SOLIMAN MD         SYSTEM ID:  [id number]  I personally reviewed the above imaging.    ASSESSMENT/PLAN:    #1 Choroidal melanoma, right eye, (T1a) Stage I  It was a pleasure to see Mrs. Amelia Caldwell today. 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 She had CT-CAP, which was negative for evidence of metastatic disease.  -RTC in 6 months with CT CAP and labs.    #2 Endometrial thickening  On CT-scan there is some heterogeneous thickening of the endometrium. We obtained a dedicated pelvic ultrasound for further evaluation, which confirmed the endometrial thickening.     -pelvic ultrasound and return with return to see Senait Frye in follow-up.    Senait Frye PA-C  [facility]  [address] [place], [place] [id number] [phone]    *** minutes spent on the date of the encounter doing chart review, review of test results, interpretation of tests, patient visit, and documentation       Virtual Visit Details    Type of service:  Video Visit     Originating Location (pt. Location): Home  Distant Location (provider location):  On-site  Platform used for Video Visit: Sharon    Is the patient currently in the state of [place]?  YES    Visit mode:VIDEO    If the visit is dropped, the patient can be reconnected by: VIDEO VISIT: Text to cell phone:   Telephone Information:   Mobile [phone]       Will anyone else be joining the visit? NO  (If patient encounters technical issues they should call [phone])    How would you like to obtain your AVS? MyChart    Are changes needed to the allergy or medication list? Pt stated no med changes    Reason for visit: RECHECK    Melva Valentine VVF      Video Visit Start Time: 8:21 am  Video End Time: 8:30 am      MEDICAL ONCOLOGY PROGRESS NOTE  Melanoma Clinic  Oct 6, 2023      Chief Complaint: Right eye choroidal melanoma    History of Present Illness:  Cheyenne Caldwell is a 58 year old female with diabetes type 2 and hypertension, and a recent diagnosis of choroidal melanoma of the right eye. She presents via video visit.    8/2/22, patient was initially found to have a choroidal lesion measuring 0.77mmH x 4.37 mmL x 4.50 mmW B-scan.    11/1/22, there is worsening compared to prior study with choroidal lesion measuring 1.87 mmH x 5.86 mmL x 6.60 mmW.    On 1/30/2023, she had plaque brachytherapy placement. Biopsy attempted but could not be completed. Plaque removal on 2/2/23.    Interval History:  -Denies vaginal bleeding.  -Has had intermittent suprapubic pressure, improved with Advil. Pressure has been occurring over the last few months.   -Pressure has been occurring about once/week.   -Denies abdominal pain.   -Denies any current back pain.  -Energy is doing well.   -Works full time in [employer].     Current Outpatient Medications   Medication Sig Dispense Refill    MOUNJARO 2.5 MG/0.5ML pen ADMINISTER 2.5MG UNDER THE SKIN 1 TIME A WEEK      acetaminophen (TYLENOL) 325 MG tablet Take 325-650 mg by mouth every 6 hours as needed for mild pain      atorvastatin (LIPITOR) 20 MG tablet Take 20 mg by mouth daily      blood glucose (ONETOUCH VERIO IQ) test strip Use  to test daily.       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 Three children and 2 grandchildren. No smoking.     Objective:  General: patient appears well in no acute distress, alert and oriented, speech clear and fluid  Skin: no visualized rash or lesions on visualized skin  Resp: Appears to be breathing comfortably without accessory muscle usage, speaking in full sentences, no audible wheezes or cough.  Psych: Coherent speech, normal rate and volume, able to articulate logical thoughts, able to abstract reason, no tangential thoughts, no hallucinations or delusions  Patient's affect is appropriate.    Imaging Studies:  US Pelvic Complete with Transvaginal  Narrative: US PELVIC TRANSABDOMINAL AND TRANSVAGINAL 10/4/2023 3:37 PM    CLINICAL HISTORY: thickened endometrium on prior CT  TECHNIQUE: Transabdominal scans were performed. Endovaginal ultrasound  was performed to better visualize the adnexa.    COMPARISON: CT 9/18/2023    FINDINGS:    UTERUS: 8.7 x 4.7 x 4.2 cm. Normal in size and position. Intramural  uterine fibroids noted, largest in the posterior uterine body  measuring up to 1.4 cm. Small nabothian cysts in the cervix.    ENDOMETRIUM: 10 mm. Thickened but smooth endometrium. No internal flow  visualized.    RIGHT OVARY: 2.1 x 1.5 x 1.4 cm. Normal with flow demonstrated.    LEFT OVARY: 2.2 x 1.5 x 1.4 cm. Normal with flow demonstrated.    No  significant free fluid.  Impression: IMPRESSION:  1.  Thickened endometrium for a postmenopausal patient. Could consider  sampling, particularly if patient is experiencing vaginal bleeding.  2.  Small uterine fibroids.    ADELA SOLIMAN MD         SYSTEM ID:  [id number]  I personally reviewed the above imaging.    ASSESSMENT/PLAN:    #1 Choroidal melanoma, right eye, (T1a) Stage I  It was a pleasure to see Mrs. Amelia Caldwell today. 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 She had CT-CAP, which was negative for evidence of metastatic disease.  -RTC in 6 months from her last CT with CT CAP and labs.    #2 Endometrial thickening  On CT-scan there is some heterogeneous thickening of the endometrium. We obtained a dedicated pelvic ultrasound for further evaluation, which confirmed the endometrial thickening. I recommend that she see her gynecologist for further evaluation and consideration of biopsy.     Senait Frye PA-C  [facility] [place] [id number] [phone]    *** minutes spent on the date of the encounter doing chart review, review of test results, interpretation of tests, patient visit, and documentation        Again, thank you for allowing me to participate in the care of your patient.        Sincerely,        Senait Frye PA-C

## 2023-10-06 NOTE — PROGRESS NOTES
Virtual Visit Details    Type of service:  Video Visit     Originating Location (pt. Location): Home  Distant Location (provider location):  On-site  Platform used for Video Visit: Sharon    Is the patient currently in the state of [place]? YES    Visit mode:VIDEO    If the visit is dropped, the patient can be reconnected by: VIDEO VISIT: Text to cell phone:   Telephone Information:   Mobile [phone]       Will anyone else be joining the visit? NO  (If patient encounters technical issues they should call [phone])    How would you like to obtain your AVS? MyChart    Are changes needed to the allergy or medication list? Pt stated no med changes    Reason for visit: RECHECK    Melva Valentine VVF      Video Visit Start Time: 8:21 am  Video End Time: 8:30 am      MEDICAL ONCOLOGY PROGRESS NOTE  Melanoma Clinic  Oct 6, 2023      Chief Complaint: Right eye choroidal melanoma    History of Present Illness:  Cheyenne Caldwell is a 58 year old female with diabetes type 2 and hypertension, and a recent diagnosis of choroidal melanoma of the right eye. She presents via video visit.    8/2/22, patient was initially found to have a choroidal lesion measuring 0.77mmH x 4.37 mmL x 4.50 mmW B-scan.    11/1/22, there is worsening compared to prior study with choroidal lesion measuring 1.87 mmH x 5.86 mmL x 6.60 mmW.    On 1/30/2023, she had plaque brachytherapy placement. Biopsy attempted but could not be completed. Plaque removal on 2/2/23.    Interval History:  -Denies vaginal bleeding.  -Has had intermittent suprapubic pressure, improved with Advil. Pressure has been occurring over the last few months.   -Pressure has been occurring about once/week.   -Denies abdominal pain.   -Denies any current back pain.  -Energy is doing well.   -Works full time in [employer].     Current Outpatient Medications   Medication Sig Dispense Refill    MOUNJARO 2.5 MG/0.5ML pen ADMINISTER 2.5MG UNDER THE SKIN 1 TIME A WEEK       acetaminophen (TYLENOL) 325 MG tablet Take 325-650 mg by mouth every 6 hours as needed for mild pain      atorvastatin (LIPITOR) 20 MG tablet Take 20 mg by mouth daily      blood glucose (ONETOUCH VERIO IQ) test strip Use  to test daily.      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 Three children and 2 grandchildren. No smoking.     Objective:  General: patient appears well in no acute distress, alert and oriented, speech clear and fluid  Skin: no visualized rash or lesions on visualized skin  Resp: Appears to be breathing comfortably without accessory muscle usage, speaking in full sentences, no audible wheezes or cough.  Psych: Coherent speech, normal rate and volume, able to articulate logical thoughts, able to abstract reason, no tangential thoughts, no hallucinations or delusions  Patient's affect is appropriate.    Imaging Studies:  US Pelvic Complete with Transvaginal  Narrative: US PELVIC TRANSABDOMINAL AND TRANSVAGINAL 10/4/2023 3:37 PM    CLINICAL HISTORY: thickened endometrium on prior CT  TECHNIQUE: Transabdominal scans were performed. Endovaginal ultrasound  was performed to better visualize the adnexa.    COMPARISON: CT 9/18/2023    FINDINGS:    UTERUS: 8.7 x 4.7 x 4.2 cm. Normal in size and position. Intramural  uterine fibroids noted, largest in the posterior uterine body  measuring up to 1.4 cm. Small nabothian cysts in  the cervix.    ENDOMETRIUM: 10 mm. Thickened but smooth endometrium. No internal flow  visualized.    RIGHT OVARY: 2.1 x 1.5 x 1.4 cm. Normal with flow demonstrated.    LEFT OVARY: 2.2 x 1.5 x 1.4 cm. Normal with flow demonstrated.    No significant free fluid.  Impression: IMPRESSION:  1.  Thickened endometrium for a postmenopausal patient. Could consider  sampling, particularly if patient is experiencing vaginal bleeding.  2.  Small uterine fibroids.    ADELA SOLIMAN MD         SYSTEM ID:  [id number]  I personally reviewed the above imaging.    ASSESSMENT/PLAN:    #1 Choroidal melanoma, right eye, (T1a) Stage I  It was a pleasure to see Mrs. Amelia Caldwell today. 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 She had CT-CAP, which was negative for evidence of metastatic disease.  -RTC in 6 months from her last CT with CT CAP and labs.    #2 Endometrial thickening  On CT-scan there is some heterogeneous thickening of the endometrium. We obtained a dedicated pelvic ultrasound for further evaluation, which confirmed the endometrial thickening. I recommend that she see her gynecologist for further evaluation and consideration of biopsy.     Senait Frye PA-C  [facility]  [address] [place] [address] [phone]    20 minutes spent on the date of the encounter doing chart review, review of test results, interpretation of tests, patient visit, and documentation

## 2023-10-06 NOTE — NURSING NOTE
Is the patient currently in the state of [place]? YES    Visit mode:VIDEO    If the visit is dropped, the patient can be reconnected by: VIDEO VISIT: Send to e-mail at: [email or web address]    Will anyone else be joining the visit? NO  (If patient encounters technical issues they should call [phone]    How would you like to obtain your AVS? MyChart    Are changes needed to the allergy or medication list? Pt stated no changes to allergies and Pt stated no med changes    Reason for visit: MONI ARNETT

## 2023-10-17 ENCOUNTER — TRANSFERRED RECORDS (OUTPATIENT)
Dept: HEALTH INFORMATION MANAGEMENT | Facility: CLINIC | Age: 58
End: 2023-10-17
Payer: COMMERCIAL

## 2023-10-17 LAB — RETINOPATHY: NEGATIVE

## 2024-01-14 ENCOUNTER — HEALTH MAINTENANCE LETTER (OUTPATIENT)
Age: 59
End: 2024-01-14

## 2024-04-10 ENCOUNTER — HOSPITAL ENCOUNTER (OUTPATIENT)
Dept: CT IMAGING | Facility: CLINIC | Age: 59
Discharge: HOME | End: 2024-04-10
Attending: PHYSICIAN ASSISTANT
Payer: COMMERCIAL

## 2024-04-10 ENCOUNTER — LAB (OUTPATIENT)
Dept: LAB | Facility: CLINIC | Age: 59
End: 2024-04-10
Attending: PHYSICIAN ASSISTANT
Payer: COMMERCIAL

## 2024-04-10 DIAGNOSIS — C69.31 MALIGNANT MELANOMA OF CHOROID OF RIGHT EYE (H): ICD-10-CM

## 2024-04-10 LAB
ALBUMIN SERPL BCG-MCNC: 4.5 G/DL (ref 3.5–5.2)
ALP SERPL-CCNC: 71 U/L (ref 40–150)
ALT SERPL W P-5'-P-CCNC: 37 U/L (ref 0–50)
ANION GAP SERPL CALCULATED.3IONS-SCNC: 12 MMOL/L (ref 7–15)
AST SERPL W P-5'-P-CCNC: 22 U/L (ref 0–45)
BASOPHILS # BLD AUTO: 0 10E3/UL (ref 0–0.2)
BASOPHILS NFR BLD AUTO: 0 %
BILIRUB SERPL-MCNC: 0.3 MG/DL
BUN SERPL-MCNC: 17.5 MG/DL (ref 6–20)
CALCIUM SERPL-MCNC: 9.1 MG/DL (ref 8.6–10)
CHLORIDE SERPL-SCNC: 103 MMOL/L (ref 98–107)
CREAT SERPL-MCNC: 0.86 MG/DL (ref 0.51–0.95)
DEPRECATED HCO3 PLAS-SCNC: 24 MMOL/L (ref 22–29)
EGFRCR SERPLBLD CKD-EPI 2021: 78 ML/MIN/1.73M2
EOSINOPHIL # BLD AUTO: 0.1 10E3/UL (ref 0–0.7)
EOSINOPHIL NFR BLD AUTO: 1 %
ERYTHROCYTE [DISTWIDTH] IN BLOOD BY AUTOMATED COUNT: 12.2 % (ref 10–15)
GLUCOSE SERPL-MCNC: 89 MG/DL (ref 70–99)
HCT VFR BLD AUTO: 39.6 % (ref 35–47)
HGB BLD-MCNC: 13.2 G/DL (ref 11.7–15.7)
IMM GRANULOCYTES # BLD: 0 10E3/UL
IMM GRANULOCYTES NFR BLD: 0 %
LYMPHOCYTES # BLD AUTO: 2.1 10E3/UL (ref 0.8–5.3)
LYMPHOCYTES NFR BLD AUTO: 37 %
MCH RBC QN AUTO: 29.7 PG (ref 26.5–33)
MCHC RBC AUTO-ENTMCNC: 33.3 G/DL (ref 31.5–36.5)
MCV RBC AUTO: 89 FL (ref 78–100)
MONOCYTES # BLD AUTO: 0.4 10E3/UL (ref 0–1.3)
MONOCYTES NFR BLD AUTO: 7 %
NEUTROPHILS # BLD AUTO: 3.1 10E3/UL (ref 1.6–8.3)
NEUTROPHILS NFR BLD AUTO: 55 %
NRBC # BLD AUTO: 0 10E3/UL
NRBC BLD AUTO-RTO: 0 /100
PLATELET # BLD AUTO: 225 10E3/UL (ref 150–450)
POTASSIUM SERPL-SCNC: 4.6 MMOL/L (ref 3.4–5.3)
PROT SERPL-MCNC: 6.8 G/DL (ref 6.4–8.3)
RBC # BLD AUTO: 4.44 10E6/UL (ref 3.8–5.2)
SODIUM SERPL-SCNC: 139 MMOL/L (ref 135–145)
WBC # BLD AUTO: 5.6 10E3/UL (ref 4–11)

## 2024-04-10 PROCEDURE — 36415 COLL VENOUS BLD VENIPUNCTURE: CPT

## 2024-04-10 PROCEDURE — 250N000009 HC RX 250: Performed by: PHYSICIAN ASSISTANT

## 2024-04-10 PROCEDURE — 250N000011 HC RX IP 250 OP 636: Performed by: PHYSICIAN ASSISTANT

## 2024-04-10 PROCEDURE — 85025 COMPLETE CBC W/AUTO DIFF WBC: CPT

## 2024-04-10 PROCEDURE — 82040 ASSAY OF SERUM ALBUMIN: CPT

## 2024-04-10 PROCEDURE — 71260 CT THORAX DX C+: CPT

## 2024-04-10 RX ORDER — IOPAMIDOL 755 MG/ML
500 INJECTION, SOLUTION INTRAVASCULAR ONCE
Status: COMPLETED | OUTPATIENT
Start: 2024-04-10 | End: 2024-04-10

## 2024-04-10 RX ADMIN — SODIUM CHLORIDE 63 ML: 9 INJECTION, SOLUTION INTRAVENOUS at 08:52

## 2024-04-10 RX ADMIN — IOPAMIDOL 91 ML: 755 INJECTION, SOLUTION INTRAVENOUS at 08:52

## 2024-04-15 ENCOUNTER — VIRTUAL VISIT (OUTPATIENT)
Dept: ONCOLOGY | Facility: CLINIC | Age: 59
End: 2024-04-15
Attending: PHYSICIAN ASSISTANT
Payer: COMMERCIAL

## 2024-04-15 ENCOUNTER — PATIENT OUTREACH (OUTPATIENT)
Dept: ONCOLOGY | Facility: CLINIC | Age: 59
End: 2024-04-15

## 2024-04-15 VITALS — WEIGHT: 160 LBS | HEIGHT: 64 IN | BODY MASS INDEX: 27.31 KG/M2

## 2024-04-15 DIAGNOSIS — C69.31 MALIGNANT MELANOMA OF CHOROID OF RIGHT EYE (H): Primary | ICD-10-CM

## 2024-04-15 PROCEDURE — 99213 OFFICE O/P EST LOW 20 MIN: CPT | Mod: 95 | Performed by: PHYSICIAN ASSISTANT

## 2024-04-15 ASSESSMENT — PAIN SCALES - GENERAL: PAINLEVEL: NO PAIN (0)

## 2024-04-15 NOTE — PROGRESS NOTES
Writer received Cancer Risk Management Program referral, referred for:    please set up for genetic counseling, history of choroidal melanoma, strong family history of cancer on mother's side, son with BRCA2 mutation      Reviewed for appropriate plan, and sent to New Patient Scheduling for completion.

## 2024-04-15 NOTE — PROGRESS NOTES
Virtual Visit Details  Type of service:  Video Visit   Originating Location (pt. Location): Home  Distant Location (provider location):  Off-site  Platform used for Video Visit: [facility]    MEDICAL ONCOLOGY PROGRESS NOTE  Melanoma Clinic  Apr 15, 2024    Chief Complaint: Right eye choroidal melanoma    History of Present Illness:  Cheyenne Caldwell is a 58 year old female with diabetes type 2 and hypertension, and a recent diagnosis of choroidal melanoma of the right eye. She presents via video visit.    8/2/22, patient was initially found to have a choroidal lesion measuring 0.77mmH x 4.37 mmL x 4.50 mmW B-scan.    11/1/22, there is worsening compared to prior study with choroidal lesion measuring 1.87 mmH x 5.86 mmL x 6.60 mmW.    On 1/30/2023, she had plaque brachytherapy placement. Biopsy attempted but could not be completed. Plaque removal on 2/2/23.    Interval History:  -Doing well overall.  -Feeling well.   -Saw gynecologist regarding endometrial thickening. Will see anuually.  -Vision is doing well. Will see retina specialist in May.   -Energy is doing well.  -Reports a good appetite.   -Oldest son has BRCA2 mutation. Patient wonders about testing for herself and her other 2 young adult children.   -She notes that her mother had cancers of the breast, colon, and cervix and that her maternal grandmother also had breast cancer. She gets annual mammograms and colonoscopies every 5 years.     Current Outpatient Medications   Medication Sig Dispense Refill    acetaminophen (TYLENOL) 325 MG tablet Take 325-650 mg by mouth every 6 hours as needed for mild pain      atorvastatin (LIPITOR) 20 MG tablet Take 20 mg by mouth daily      blood glucose (ONETOUCH VERIO IQ) test strip Use  to test daily.      escitalopram (LEXAPRO) 10 MG tablet TAKE 1 AND 1/2 TABLETS BY MOUTH DAILY      ibuprofen (ADVIL/MOTRIN) 200 MG capsule Take 200 mg by mouth every 4 hours as needed for fever      losartan-hydrochlorothiazide (HYZAAR)  100-12.5 MG tablet Take 1 tablet by mouth daily      metFORMIN (GLUCOPHAGE XR) 500 MG 24 hr tablet Take 2,000 mg by mouth daily (with breakfast)      metoprolol succinate ER (TOPROL XL) 50 MG 24 hr tablet Take 50 mg by mouth daily      MOUNJARO 2.5 MG/0.5ML pen ADMINISTER 2.5MG UNDER THE SKIN 1 TIME A WEEK      TRULICITY 1.5 MG/0.5ML pen ADMINISTER 1.5 MG UNDER THE SKIN 1 TIME A WEEK         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 Three children and 2 grandchildren. No smoking.     Objective:  General: patient appears well in no acute distress, alert and oriented, speech clear and fluid  Skin: no visualized rash or lesions on visualized skin  Resp: Appears to be breathing comfortably without accessory muscle usage, speaking in full sentences, no audible wheezes or cough.  Psych: Coherent speech, normal rate and volume, able to articulate logical thoughts, able to abstract reason, no tangential thoughts, no hallucinations or delusions  Patient's affect is appropriate.    Most Recent 3 CBC's:  Recent Labs   Lab Test 04/10/24  0809 11/14/22  0740   WBC 5.6 6.7   HGB 13.2 13.8   MCV 89 83    236   ANEUTAUTO 3.1 3.6     Most Recent 3 BMP's:  Recent Labs   Lab Test 04/10/24  0809 02/06/23  1012 02/02/23  1350 02/02/23  1148 01/30/23  0703 11/14/22  0740     --   --   --   --  132*   POTASSIUM 4.6  --   --   --   --  4.3   CHLORIDE 103  --   --   --   --  100   CO2 24  --   --   --   --  24   BUN 17.5  --   --   --   --  18   CR 0.86 0.7  --   --   --  0.66   ANIONGAP 12  --   --   --   --  8   KRAIG 9.1  --   --   --   --  9.1   GLC 89  --  142* 137*   < > 197*   PROTTOTAL 6.8  --   --   --   --  7.5   ALBUMIN 4.5  --   --   --   --  3.8    < > = values in this interval not displayed.    Most Recent 3 LFT's:  Recent Labs   Lab Test 04/10/24  0809 11/14/22  0740   AST 22 22   ALT 37 57*   ALKPHOS 71 120   BILITOTAL 0.3 0.5   I reviewed the above labs  today.    Imaging Studies:  CT Chest/Abdomen/Pelvis w Contrast  Narrative: CT CHEST/ABDOMEN/PELVIS W CONTRAST 4/10/2024 9:04 AM    CLINICAL HISTORY: f/u of choroidal melanoma; Malignant melanoma of  choroid of right eye (H)    TECHNIQUE: CT scan of the chest, abdomen, and pelvis was performed  following injection of IV contrast. Multiplanar reformats were  obtained. Dose reduction techniques were used.   CONTRAST: 91mL Isovue-370    COMPARISON: Multiple priors most recently CT chest abdomen and pelvis  9/18/2023    FINDINGS:   LUNGS AND PLEURA: Stable sub-3 mm left lower lobe nodule (12/206) and  tiny right upper lobe nodule (12/72). No new or growing nodule. No  consolidative or acute appearing airspace opacities. No pleural  effusion or pneumothorax.    MEDIASTINUM/AXILLAE: Normal heart size. No pericardial effusion.  Normal caliber thoracic aorta. No thoracic lymphadenopathy.    CORONARY ARTERY CALCIFICATION: None.    HEPATOBILIARY: No suspicious focal liver lesion. Focal fat deposition  or altered perfusion near the falciform. No calcified gallstones or  biliary dilation.    PANCREAS: No significant mass, duct dilatation, or inflammatory  change.    SPLEEN: Normal.    ADRENAL GLANDS: Normal.    KIDNEYS/BLADDER: No significant mass, stones, or hydronephrosis.  Subcentimeter renal hypodensities compatible with cysts and do not  require further imaging follow-up. Decompressed bladder.    BOWEL: No obstruction or inflammatory change. Normal appendix.    LYMPH NODES: No abdominopelvic lymphadenopathy.    VASCULAR: Normal caliber abdominal aorta with scattered atheromatous  disease. Variant arterial anatomy with aortic origin of the common  hepatic artery.    PELVIC ORGANS: Similar degree of endometrial thickening compared to  9/18/2023. No adnexal mass.    ADDITIONAL FINDINGS: No ascites.    MUSCULOSKELETAL: No significant osseous abnormality.  Impression: IMPRESSION:  1.  No significant change. Nothing for  metastatic disease in the  chest, abdomen, or pelvis.  2.  Similar degree of endometrial thickening. Interval pelvic  ultrasound was performed on 10/4/2023 where the endometrial stripe was  mildly thickened at 10 mm. As discussed previously, if the patient is  experiencing vaginal bleeding, tissue sampling would be recommended if  not already performed.    JONNY CAIN MD         SYSTEM ID:  [id number]  I personally reviewed the above imaging report today.    ASSESSMENT/PLAN:  #1 Choroidal melanoma, right eye, (T1a) Stage I  It was a pleasure to see Mrs. Amelia Caldwell today. 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 She had CT-CAP, which was negative for evidence of metastatic disease.  -RTC in 6 months from her last CT with CT CAP and labs.    #2 Endometrial thickening  Pelvic ultrasound on 10/4/23 confirmed a thickened endometrium. She saw gynecology who recommended annual monitoring. Recommend discussing her son's BRCA2 mutation with her gynecologist.     #3 Health care maintenance.  Eye exams: Recommend exams at least every 2 years. Up to date  Dental exams: Recommend exams and cleanings every 6 months. Has appt soon.  Primary care: Recommend follow up at least annually. Up to date  Skin care: Recommend the use of sunscreen with SPF of at least 30, reapplying every 2 hours with prolonged sun exposure.   Tobacco use: Recommend abstaining. Denies use.   Alcohol use: Recommend no more than 1/day for women. Denies use.  Physical activity: Recommend regular activity, ideally 150 minutes/week of moderate intensity activity. Does piliates 2-3 times per week and goes for walks daily.     #4 Genetics  Given son's BRCA2 mutation and her strong family history of multiple cancers on her mother's side, I recommend that she meet with our genetic counselor to discuss further. She is agreeable to this and I have placed a referral. She may also  benefit from a visit with our high risk clinic. I will review her case with our high risk clinic team.     Senait Frye PA-C  [facility]  [address], [place] [id number] [phone]

## 2024-04-15 NOTE — LETTER
4/15/2024         RE: Cheyenne Caldwell  [address]  [place] [address]        Dear Colleague,    Thank you for referring your patient, Cheyenne Caldwell, to the [facility]. Please see a copy of my visit note below.    Virtual Visit Details  Type of service:  Video Visit   Originating Location (pt. Location): Home  Distant Location (provider location):  Off-site  Platform used for Video Visit: [facility] ONCOLOGY PROGRESS NOTE  Melanoma Clinic  Apr 15, 2024    Chief Complaint: Right eye choroidal melanoma    History of Present Illness:  Cheyenne Caldwell is a 58 year old female with diabetes type 2 and hypertension, and a recent diagnosis of choroidal melanoma of the right eye. She presents via video visit.    8/2/22, patient was initially found to have a choroidal lesion measuring 0.77mmH x 4.37 mmL x 4.50 mmW B-scan.    11/1/22, there is worsening compared to prior study with choroidal lesion measuring 1.87 mmH x 5.86 mmL x 6.60 mmW.    On 1/30/2023, she had plaque brachytherapy placement. Biopsy attempted but could not be completed. Plaque removal on 2/2/23.    Interval History:  -Doing well overall.  -Feeling well.   -Saw gynecologist regarding endometrial thickening. Will see anuually.  -Vision is doing well. Will see retina specialist in May.   -Energy is doing well.  -Reports a good appetite.   -Oldest son has BRCA2 mutation. Patient wonders about testing for herself and her other 2 young adult children.   -She notes that her mother had cancers of the breast, colon, and cervix and that her maternal grandmother also had breast cancer. She gets annual mammograms and colonoscopies every 5 years.     Current Outpatient Medications   Medication Sig Dispense Refill    acetaminophen (TYLENOL) 325 MG tablet Take 325-650 mg by mouth every 6 hours as needed for mild pain      atorvastatin (LIPITOR) 20 MG tablet Take 20 mg by mouth daily      blood glucose (ONETOUCH  VERIO IQ) test strip Use  to test daily.      escitalopram (LEXAPRO) 10 MG tablet TAKE 1 AND 1/2 TABLETS BY MOUTH DAILY      ibuprofen (ADVIL/MOTRIN) 200 MG capsule Take 200 mg by mouth every 4 hours as needed for fever      losartan-hydrochlorothiazide (HYZAAR) 100-12.5 MG tablet Take 1 tablet by mouth daily      metFORMIN (GLUCOPHAGE XR) 500 MG 24 hr tablet Take 2,000 mg by mouth daily (with breakfast)      metoprolol succinate ER (TOPROL XL) 50 MG 24 hr tablet Take 50 mg by mouth daily      MOUNJARO 2.5 MG/0.5ML pen ADMINISTER 2.5MG UNDER THE SKIN 1 TIME A WEEK      TRULICITY 1.5 MG/0.5ML pen ADMINISTER 1.5 MG UNDER THE SKIN 1 TIME A WEEK         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 Three children and 2 grandchildren. No smoking.     Objective:  General: patient appears well in no acute distress, alert and oriented, speech clear and fluid  Skin: no visualized rash or lesions on visualized skin  Resp: Appears to be breathing comfortably without accessory muscle usage, speaking in full sentences, no audible wheezes or cough.  Psych: Coherent speech, normal rate and volume, able to articulate logical thoughts, able to abstract reason, no tangential thoughts, no hallucinations or delusions  Patient's affect is appropriate.    Most Recent 3 CBC's:  Recent Labs   Lab Test 04/10/24  0809 11/14/22  0740   WBC 5.6 6.7   HGB 13.2 13.8   MCV 89 83    236   ANEUTAUTO 3.1 3.6     Most Recent 3 BMP's:  Recent Labs   Lab Test 04/10/24  0809 02/06/23  1012 02/02/23  1350 02/02/23  1148 01/30/23  0703 11/14/22  0740     --   --   --   --  132*   POTASSIUM 4.6  --   --   --   --  4.3   CHLORIDE 103  --   --   --   --  100   CO2 24  --   --   --   --  24   BUN 17.5  --   --   --   --  18   CR 0.86 0.7  --   --   --  0.66   ANIONGAP 12  --   --   --   --  8   KRAIG 9.1  --   --   --   --  9.1   GLC 89  --  142* 137*   < > 197*   PROTTOTAL 6.8  --   --   --   --  7.5    ALBUMIN 4.5  --   --   --   --  3.8    < > = values in this interval not displayed.    Most Recent 3 LFT's:  Recent Labs   Lab Test 04/10/24  0809 11/14/22  0740   AST 22 22   ALT 37 57*   ALKPHOS 71 120   BILITOTAL 0.3 0.5   I reviewed the above labs today.    Imaging Studies:  CT Chest/Abdomen/Pelvis w Contrast  Narrative: CT CHEST/ABDOMEN/PELVIS W CONTRAST 4/10/2024 9:04 AM    CLINICAL HISTORY: f/u of choroidal melanoma; Malignant melanoma of  choroid of right eye (H)    TECHNIQUE: CT scan of the chest, abdomen, and pelvis was performed  following injection of IV contrast. Multiplanar reformats were  obtained. Dose reduction techniques were used.   CONTRAST: 91mL Isovue-370    COMPARISON: Multiple priors most recently CT chest abdomen and pelvis  9/18/2023    FINDINGS:   LUNGS AND PLEURA: Stable sub-3 mm left lower lobe nodule (12/206) and  tiny right upper lobe nodule (12/72). No new or growing nodule. No  consolidative or acute appearing airspace opacities. No pleural  effusion or pneumothorax.    MEDIASTINUM/AXILLAE: Normal heart size. No pericardial effusion.  Normal caliber thoracic aorta. No thoracic lymphadenopathy.    CORONARY ARTERY CALCIFICATION: None.    HEPATOBILIARY: No suspicious focal liver lesion. Focal fat deposition  or altered perfusion near the falciform. No calcified gallstones or  biliary dilation.    PANCREAS: No significant mass, duct dilatation, or inflammatory  change.    SPLEEN: Normal.    ADRENAL GLANDS: Normal.    KIDNEYS/BLADDER: No significant mass, stones, or hydronephrosis.  Subcentimeter renal hypodensities compatible with cysts and do not  require further imaging follow-up. Decompressed bladder.    BOWEL: No obstruction or inflammatory change. Normal appendix.    LYMPH NODES: No abdominopelvic lymphadenopathy.    VASCULAR: Normal caliber abdominal aorta with scattered atheromatous  disease. Variant arterial anatomy with aortic origin of the common  hepatic artery.    PELVIC  ORGANS: Similar degree of endometrial thickening compared to  9/18/2023. No adnexal mass.    ADDITIONAL FINDINGS: No ascites.    MUSCULOSKELETAL: No significant osseous abnormality.  Impression: IMPRESSION:  1.  No significant change. Nothing for metastatic disease in the  chest, abdomen, or pelvis.  2.  Similar degree of endometrial thickening. Interval pelvic  ultrasound was performed on 10/4/2023 where the endometrial stripe was  mildly thickened at 10 mm. As discussed previously, if the patient is  experiencing vaginal bleeding, tissue sampling would be recommended if  not already performed.    JONNY CAIN MD         SYSTEM ID:  [id number]  I personally reviewed the above imaging report today.    ASSESSMENT/PLAN:  #1 Choroidal melanoma, right eye, (T1a) Stage I  It was a pleasure to see Mrs. Amelia Caldwell today. 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 She had CT-CAP, which was negative for evidence of metastatic disease.  -RTC in 6 months from her last CT with CT CAP and labs.    #2 Endometrial thickening  Pelvic ultrasound on 10/4/23 confirmed a thickened endometrium. She saw gynecology who recommended annual monitoring. Recommend discussing her son's BRCA2 mutation with her gynecologist.     #3 Health care maintenance.  Eye exams: Recommend exams at least every 2 years. Up to date  Dental exams: Recommend exams and cleanings every 6 months. Has appt soon.  Primary care: Recommend follow up at least annually. Up to date  Skin care: Recommend the use of sunscreen with SPF of at least 30, reapplying every 2 hours with prolonged sun exposure.   Tobacco use: Recommend abstaining. Denies use.   Alcohol use: Recommend no more than 1/day for women. Denies use.  Physical activity: Recommend regular activity, ideally 150 minutes/week of moderate intensity activity. Does piliates 2-3 times per week and goes for walks daily.     #4  Genetics  Given son's BRCA2 mutation and her strong family history of multiple cancers on her mother's side, I recommend that she meet with our genetic counselor to discuss further. She is agreeable to this and I have placed a referral. She may also benefit from a visit with our high risk clinic. I will review her case with our high risk clinic team.     Senait Frye PA-C  [facility]  [address] [place] [id number] [phone]

## 2024-04-15 NOTE — NURSING NOTE
Patient reviewed medications and allergies in Mychart during e-check in and said everything looked correct.      Is the patient currently in the state of [place]? YES    Visit mode:VIDEO    If the visit is dropped, the patient can be reconnected by: VIDEO VISIT: Text to cell phone:   Telephone Information:   Mobile [phone]    and VIDEO VISIT: Send to e-mail at: [email or web address]    Will anyone else be joining the visit? NO  (If patient encounters technical issues they should call [phone])    How would you like to obtain your AVS? MyChart    Are changes needed to the allergy or medication list? No    Are refills needed on medications prescribed by this physician?     Reason for visit: RECHECK (Follow up about scans, one of the kids did a Genetic testing and having some questions. )      Anastasia ARNETT

## 2024-06-02 ENCOUNTER — HEALTH MAINTENANCE LETTER (OUTPATIENT)
Age: 59
End: 2024-06-02

## 2024-07-17 ENCOUNTER — PATIENT OUTREACH (OUTPATIENT)
Dept: ONCOLOGY | Facility: CLINIC | Age: 59
End: 2024-07-17
Payer: COMMERCIAL

## 2024-07-17 NOTE — PROGRESS NOTES
[facility]: Cancer Care                                                                                          Chart review.  Changed patient's enrollment status to maintenance.    Klyah Ch RN  Cancer Care Coordinator  [facility]

## 2024-09-17 PROBLEM — C69.41 MALIGNANT MELANOMA OF UVEA OF RIGHT EYE (H): Status: ACTIVE | Noted: 2023-01-24

## 2024-09-17 NOTE — PROGRESS NOTES
Virtual Visit Details    Type of service:  Video Visit     Originating Location (pt. 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  Evaluation in ophthalmology clinic 11/01/2022, revealed an elevated choroidal nevus located temporal to the fovea with increased subretinal fluid since the previous examination (08/02/2022).  Patient noted an area of blurred vision nasally in the right eye.  B-scan ultrasound 11/01/2022, revealed an elevated choroidal lesion measuring 5.86 mm(L) x 6.6 mm(W) x 1.87 mm(H).  Previous B-scan ultrasound 08/02/2022, revealed the choroidal lesion measuring 4.37 mm(L) x 4.5 mm (W) x 0.77 mm(H). CT chest, abdomen, pelvis CT chest, abdomen, pelvis 11/14/2022, was negative for adenopathy or metastases. Patient received definitive 125-I plaque brachytherapy to the right eye uveal melanoma (8500 cGy, 9 seeds) on 01/30/2023 with plaque removal 02/02/2023.  Biopsy could not be performed at the time of plaque placement.    Restaging CT chest, abdomen, pelvis 02/06/2023, 09/18/2023, 04/10/2024, was negative for adenopathy or metastases. Patient has loss of peripheral vision on the lateral right eye. Patient denies fever, anorexia, weight loss, headache, cough, dyspnea, chest pain, abdominal pain, nausea, vomiting, constipation, diarrhea, bleeding, or bone pain.     PAST HISTORY:   -Hypertension.  -Diabetes.  -Stage I uveal melanoma of the right eye (cT1a, cN0, cM0).  -GERD.  -Fatty liver.  -Colonoscopy 02/22/2021, was negative for polyps.  -Stress urinary incontinence. Mid-urethral sling scheduled 10/31/2024.  -Endometrial biopsy, 10/14/2024.  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  -Tonsillectomy, 1970.    ALLERGIES: Penicillin causes swelling.    MEDICATIONS:   Current Outpatient Medications   Medication Sig Dispense Refill    atorvastatin (LIPITOR) 20 MG tablet Take 20 mg by mouth daily      escitalopram (LEXAPRO) 10 MG tablet TAKE 1 AND 1/2 TABLETS BY MOUTH DAILY      losartan (COZAAR) 100 MG tablet Take 100 mg by mouth daily.      metFORMIN (GLUCOPHAGE XR) 500 MG 24 hr tablet Take 2,000 mg by mouth daily (with breakfast)      metoprolol succinate ER (TOPROL XL) 50 MG 24 hr tablet Take 50 mg by mouth daily      MOUNJARO 2.5 MG/0.5ML pen ADMINISTER 2.5MG UNDER THE SKIN 1 TIME A WEEK      acetaminophen (TYLENOL) 325 MG tablet Take 325-650 mg by mouth every 6 hours as needed for mild pain      blood glucose (ONETOUCH VERIO IQ) test strip Use  to test daily.      ibuprofen (ADVIL/MOTRIN) 200 MG capsule Take 200 mg by mouth every 4 hours as needed for fever      losartan-hydrochlorothiazide (HYZAAR) 100-12.5 MG tablet Take 1 tablet by mouth daily      TRULICITY 1.5 MG/0.5ML pen ADMINISTER 1.5 MG UNDER THE SKIN 1 TIME A WEEK         FAMILY HISTORY: Father  at age 67 of cardiac disease and had a history of diabetes.  Mother  age 80 of multiple sclerosis and had a history of breast cancer, colon cancer, and cervical cancer.  There is 1 brother Nazario age 56 with hypertension.  There are 2 sons: 1 son age 37 with BRCA mutation; another son age 25 is alive and well.  There is 1 daughter age 35 who is alive and well.    SOCIAL HISTORY: Patient was born and raised in [place].  She is  since  and lives in [facility] with her spouse.  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  Patient received her degree in business and [employer] at [employer].  She does not consume tobacco.  She previously drank wine but quit 3 years ago due to diabetes.  There is no  service.    Social History     Tobacco Use    Smoking status:  Never     Passive exposure: Never    Smokeless tobacco: Never   Vaping Use    Vaping status: Never Used   Substance Use Topics    Alcohol use: Not Currently     Comment: last use 2020    Drug use: Never       REVIEW OF SYSTEMS: Review of systems reviewed with the patient and otherwise negative except for those detailed above.    PHYSICAL EXAM: Not done. Telehealth visit. LMP 11/01/2021 (Within Weeks) .  There is no height or weight on file to calculate BSA.  Patient appears well. Breathing is normal and nonlabored.     LABS REVIEWED:   Component      Latest Ref Rng 10/15/2024  8:09 AM   WBC      4.0 - 11.0 10e3/uL 5.5    RBC Count      3.80 - 5.20 10e6/uL 4.54    Hemoglobin      11.7 - 15.7 g/dL 13.4    Hematocrit      35.0 - 47.0 % 40.2    MCV      78 - 100 fL 89    MCH      26.5 - 33.0 pg 29.5    MCHC      31.5 - 36.5 g/dL 33.3    RDW      10.0 - 15.0 % 11.9    Platelet Count      150 - 450 10e3/uL 224    % Neutrophils      % 52    % Lymphocytes      % 37    % Monocytes      % 8    % Eosinophils      % 2    % Basophils      % 1    % Immature Granulocytes      % 0    NRBCs per 100 WBC      <1 /100 0    Absolute Neutrophils      1.6 - 8.3 10e3/uL 2.9    Absolute Lymphocytes      0.8 - 5.3 10e3/uL 2.0    Absolute Monocytes      0.0 - 1.3 10e3/uL 0.5    Absolute Eosinophils      0.0 - 0.7 10e3/uL 0.1    Absolute Basophils      0.0 - 0.2 10e3/uL 0.0    Absolute Immature Granulocytes      <=0.4 10e3/uL 0.0    Absolute NRBCs      10e3/uL 0.0    Sodium      135 - 145 mmol/L 138    Potassium      3.4 - 5.3 mmol/L 4.2    Carbon Dioxide (CO2)      22 - 29 mmol/L 25    Anion Gap      7 - 15 mmol/L 11    Urea Nitrogen      8.0 - 23.0 mg/dL 19.6    Creatinine      0.51 - 0.95 mg/dL 0.80    GFR Estimate      >60 mL/min/1.73m2 84    Calcium      8.8 - 10.4 mg/dL 9.3    Chloride      98 - 107 mmol/L 102    Glucose      70 - 99 mg/dL 101 (H)    Alkaline Phosphatase      40 - 150 U/L 71    AST      0 - 45 U/L 22    ALT      0 - 50  U/L 40    Protein Total      6.4 - 8.3 g/dL 7.0    Albumin      3.5 - 5.2 g/dL 4.7    Bilirubin Total      <=1.2 mg/dL 0.4        IMAGING REVIEWED: CT chest, abdomen, pelvis 10/15/2024, was negative for adenopathy or metastases.    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 Multiplanar reformats were  obtained. Dose reduction techniques were used.   CONTRAST: 79mL Isovue-370    COMPARISON: 4/10/2024    FINDINGS:   LUNGS AND PLEURA: A few stable small pulmonary nodules. For example, a  1 mm right upper lobe nodule (series 12, image 79) and left lower lobe  3 mm nodule (12/235) are stable. No new or enlarging nodules. No  infiltrate or pleural effusion.    MEDIASTINUM/AXILLAE: No lymphadenopathy. No thoracic aortic aneurysm.  Mild coronary artery calcifications. No pericardial effusion.    HEPATOBILIARY: Normal.    PANCREAS: Normal.    SPLEEN: Normal.    ADRENAL GLANDS: Normal.    KIDNEYS/BLADDER: Stable subcentimeter left renal cyst requiring no  specific follow-up is otherwise normal kidneys.    BOWEL: No obstruction or inflammatory change.    PELVIC ORGANS: No pelvic masses. Endometrial lining measures 8 mm  (series 8, image 67), previously about 10 mm.    ADDITIONAL FINDINGS: No ascites. No lymphadenopathy. Major  intra-abdominal vasculature is patent.    MUSCULOSKELETAL: No suspicious lesions in the bones.      Impression    IMPRESSION:  1.  No metastatic disease in the chest, abdomen and pelvis.    EUGENIO LOZANO MD         SYSTEM ID:  [id number]       IMPRESSION/PLAN: Stage I uveal melanoma of the right eye.  Uveal melanoma is a 1.87 mm thick melanoma in the right eye choroid.  Patient underwent definitive  125-I plaque brachytherapy (01/30/2023).  There is no evidence of metastases noted on restaging CT scan (10/15/2024).  Patient will continue close surveillance consisting of clinical evaluation, CT chest, MRI abdomen every 6 months for years 1-5, then annually for years 6-10.  Patient will return to clinic in 6 months with CBC, metabolic panel, CT chest, MRI abdomen. The current and past history obtained from the patient and outside medical records, clinical evaluation, reviewing diagnostic tests and viewing images with the patient, and assessment and planning occurred over 60 minutes.       Niko Pelaez MD    cc: Sherrell Ybarra PA-C

## 2024-10-15 ENCOUNTER — HOSPITAL ENCOUNTER (OUTPATIENT)
Dept: CT IMAGING | Facility: CLINIC | Age: 59
Discharge: HOME | End: 2024-10-15
Attending: PHYSICIAN ASSISTANT
Payer: COMMERCIAL

## 2024-10-15 ENCOUNTER — LAB (OUTPATIENT)
Dept: LAB | Facility: CLINIC | Age: 59
End: 2024-10-15
Attending: PHYSICIAN ASSISTANT
Payer: COMMERCIAL

## 2024-10-15 DIAGNOSIS — C69.31 MALIGNANT MELANOMA OF CHOROID OF RIGHT EYE (H): ICD-10-CM

## 2024-10-15 LAB
ALBUMIN SERPL BCG-MCNC: 4.7 G/DL (ref 3.5–5.2)
ALP SERPL-CCNC: 71 U/L (ref 40–150)
ALT SERPL W P-5'-P-CCNC: 40 U/L (ref 0–50)
ANION GAP SERPL CALCULATED.3IONS-SCNC: 11 MMOL/L (ref 7–15)
AST SERPL W P-5'-P-CCNC: 22 U/L (ref 0–45)
BASOPHILS # BLD AUTO: 0 10E3/UL (ref 0–0.2)
BASOPHILS NFR BLD AUTO: 1 %
BILIRUB SERPL-MCNC: 0.4 MG/DL
BUN SERPL-MCNC: 19.6 MG/DL (ref 8–23)
CALCIUM SERPL-MCNC: 9.3 MG/DL (ref 8.8–10.4)
CHLORIDE SERPL-SCNC: 102 MMOL/L (ref 98–107)
CREAT SERPL-MCNC: 0.8 MG/DL (ref 0.51–0.95)
EGFRCR SERPLBLD CKD-EPI 2021: 84 ML/MIN/1.73M2
EOSINOPHIL # BLD AUTO: 0.1 10E3/UL (ref 0–0.7)
EOSINOPHIL NFR BLD AUTO: 2 %
ERYTHROCYTE [DISTWIDTH] IN BLOOD BY AUTOMATED COUNT: 11.9 % (ref 10–15)
GLUCOSE SERPL-MCNC: 101 MG/DL (ref 70–99)
HCO3 SERPL-SCNC: 25 MMOL/L (ref 22–29)
HCT VFR BLD AUTO: 40.2 % (ref 35–47)
HGB BLD-MCNC: 13.4 G/DL (ref 11.7–15.7)
IMM GRANULOCYTES # BLD: 0 10E3/UL
IMM GRANULOCYTES NFR BLD: 0 %
LYMPHOCYTES # BLD AUTO: 2 10E3/UL (ref 0.8–5.3)
LYMPHOCYTES NFR BLD AUTO: 37 %
MCH RBC QN AUTO: 29.5 PG (ref 26.5–33)
MCHC RBC AUTO-ENTMCNC: 33.3 G/DL (ref 31.5–36.5)
MCV RBC AUTO: 89 FL (ref 78–100)
MONOCYTES # BLD AUTO: 0.5 10E3/UL (ref 0–1.3)
MONOCYTES NFR BLD AUTO: 8 %
NEUTROPHILS # BLD AUTO: 2.9 10E3/UL (ref 1.6–8.3)
NEUTROPHILS NFR BLD AUTO: 52 %
NRBC # BLD AUTO: 0 10E3/UL
NRBC BLD AUTO-RTO: 0 /100
PLATELET # BLD AUTO: 224 10E3/UL (ref 150–450)
POTASSIUM SERPL-SCNC: 4.2 MMOL/L (ref 3.4–5.3)
PROT SERPL-MCNC: 7 G/DL (ref 6.4–8.3)
RBC # BLD AUTO: 4.54 10E6/UL (ref 3.8–5.2)
SODIUM SERPL-SCNC: 138 MMOL/L (ref 135–145)
WBC # BLD AUTO: 5.5 10E3/UL (ref 4–11)

## 2024-10-15 PROCEDURE — 250N000009 HC RX 250: Performed by: PHYSICIAN ASSISTANT

## 2024-10-15 PROCEDURE — 250N000011 HC RX IP 250 OP 636: Performed by: PHYSICIAN ASSISTANT

## 2024-10-15 PROCEDURE — 36415 COLL VENOUS BLD VENIPUNCTURE: CPT

## 2024-10-15 PROCEDURE — 85004 AUTOMATED DIFF WBC COUNT: CPT

## 2024-10-15 PROCEDURE — 74177 CT ABD & PELVIS W/CONTRAST: CPT

## 2024-10-15 PROCEDURE — 80053 COMPREHEN METABOLIC PANEL: CPT

## 2024-10-15 RX ORDER — IOPAMIDOL 755 MG/ML
500 INJECTION, SOLUTION INTRAVASCULAR ONCE
Status: COMPLETED | OUTPATIENT
Start: 2024-10-15 | End: 2024-10-15

## 2024-10-15 RX ADMIN — SODIUM CHLORIDE 61 ML: 9 INJECTION, SOLUTION INTRAVENOUS at 08:50

## 2024-10-15 RX ADMIN — IOPAMIDOL 79 ML: 755 INJECTION, SOLUTION INTRAVENOUS at 08:50

## 2024-10-17 ENCOUNTER — VIRTUAL VISIT (OUTPATIENT)
Dept: ONCOLOGY | Facility: CLINIC | Age: 59
End: 2024-10-17
Attending: INTERNAL MEDICINE
Payer: COMMERCIAL

## 2024-10-17 VITALS — BODY MASS INDEX: 25.95 KG/M2 | HEIGHT: 64 IN | WEIGHT: 152 LBS

## 2024-10-17 DIAGNOSIS — C69.41 MALIGNANT MELANOMA OF UVEA OF RIGHT EYE (H): Primary | ICD-10-CM

## 2024-10-17 PROCEDURE — 99215 OFFICE O/P EST HI 40 MIN: CPT | Mod: 95 | Performed by: INTERNAL MEDICINE

## 2024-10-17 PROCEDURE — 99417 PROLNG OP E/M EACH 15 MIN: CPT | Mod: 95 | Performed by: INTERNAL MEDICINE

## 2024-10-17 RX ORDER — LOSARTAN POTASSIUM 100 MG/1
100 TABLET ORAL DAILY
COMMUNITY
Start: 2024-01-23 | End: 2025-01-22

## 2024-10-17 ASSESSMENT — PAIN SCALES - GENERAL: PAINLEVEL: NO PAIN (0)

## 2024-10-17 NOTE — NURSING NOTE
Current patient location: [address] [place] [id number]    Is the patient currently in the state of [place]? YES    Visit mode:VIDEO    If the visit is dropped, the patient can be reconnected by: VIDEO VISIT: Text to cell phone:   Telephone Information:   Mobile [phone]       Will anyone else be joining the visit? NO  (If patient encounters technical issues they should call [phone]    Are changes needed to the allergy or medication list? No    Are refills needed on medications prescribed by this physician? NO    Rooming Documentation:  Questionnaire complete    Reason for visit: MONI ARNETT

## 2024-10-17 NOTE — LETTER
10/17/2024      Cheyenne Caldwell  [address] [facility] [address]      Dear Colleague,    Thank you for referring your patient, Cheyenne Caldwell, to the [facility]. Please see a copy of my visit note below.    Virtual Visit Details    Type of service:  Video Visit     Originating Location (pt. 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  Evaluation in ophthalmology clinic 11/01/2022, revealed an elevated choroidal nevus located temporal to the fovea with increased subretinal fluid since the previous examination (08/02/2022).  Patient noted an area of blurred vision nasally in the right eye.  B-scan ultrasound 11/01/2022, revealed an elevated choroidal lesion measuring 5.86 mm(L) x 6.6 mm(W) x 1.87 mm(H).  Previous B-scan ultrasound 08/02/2022, revealed the choroidal lesion measuring 4.37 mm(L) x 4.5 mm (W) x 0.77 mm(H). CT chest, abdomen, pelvis CT chest, abdomen, pelvis 11/14/2022, was negative for adenopathy or metastases. Patient received definitive 125-I plaque brachytherapy to the right eye uveal melanoma (8500 cGy, 9 seeds) on 01/30/2023 with plaque removal 02/02/2023.  Biopsy could not be performed at the time of plaque placement.    Restaging CT chest, abdomen, pelvis 02/06/2023, 09/18/2023, 04/10/2024, was negative for adenopathy or metastases. Patient has loss of peripheral vision on the lateral right eye. Patient denies fever, anorexia, weight loss, headache, cough, dyspnea, chest pain, abdominal pain, nausea, vomiting, constipation, diarrhea, bleeding, or bone pain.     PAST HISTORY:   -Hypertension.  -Diabetes.  -Stage I uveal melanoma of the right  eye (cT1a, cN0, cM0).  -GERD.  -Fatty liver.  -Colonoscopy 2021, was negative for polyps.  -Stress urinary incontinence. Mid-urethral sling scheduled 10/31/2024.  -Endometrial biopsy, 10/14/2024.  -Excision of angiomyoma from the anterior left thigh, 2018.  -Tonsillectomy, 1970.    ALLERGIES: Penicillin causes swelling.    MEDICATIONS:   Current Outpatient Medications   Medication Sig Dispense Refill     atorvastatin (LIPITOR) 20 MG tablet Take 20 mg by mouth daily       escitalopram (LEXAPRO) 10 MG tablet TAKE 1 AND 1/2 TABLETS BY MOUTH DAILY       losartan (COZAAR) 100 MG tablet Take 100 mg by mouth daily.       metFORMIN (GLUCOPHAGE XR) 500 MG 24 hr tablet Take 2,000 mg by mouth daily (with breakfast)       metoprolol succinate ER (TOPROL XL) 50 MG 24 hr tablet Take 50 mg by mouth daily       MOUNJARO 2.5 MG/0.5ML pen ADMINISTER 2.5MG UNDER THE SKIN 1 TIME A WEEK       acetaminophen (TYLENOL) 325 MG tablet Take 325-650 mg by mouth every 6 hours as needed for mild pain       blood glucose (ONETOUCH VERIO IQ) test strip Use  to test daily.       ibuprofen (ADVIL/MOTRIN) 200 MG capsule Take 200 mg by mouth every 4 hours as needed for fever       losartan-hydrochlorothiazide (HYZAAR) 100-12.5 MG tablet Take 1 tablet by mouth daily       TRULICITY 1.5 MG/0.5ML pen ADMINISTER 1.5 MG UNDER THE SKIN 1 TIME A WEEK         FAMILY HISTORY: Father  at age 67 of cardiac disease and had a history of diabetes.  Mother  age 80 of multiple sclerosis and had a history of breast cancer, colon cancer, and cervical cancer.  There is 1 brother Nazario age 56 with hypertension.  There are 2 sons: 1 son age 37 with BRCA mutation; another son age 25 is alive and well.  There is 1 daughter age 35 who is alive and well.    SOCIAL HISTORY: Patient was born and raised in [place].  She is  since  and lives in [facility] with her spouse.  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   Patient received her degree in business and [employer] at [employer].  She does not consume tobacco.  She previously drank wine but quit 3 years ago due to diabetes.  There is no  service.    Social History     Tobacco Use     Smoking status: Never     Passive exposure: Never     Smokeless tobacco: Never   Vaping Use     Vaping status: Never Used   Substance Use Topics     Alcohol use: Not Currently     Comment: last use 2020     Drug use: Never       REVIEW OF SYSTEMS: Review of systems reviewed with the patient and otherwise negative except for those detailed above.    PHYSICAL EXAM: Not done. Telehealth visit. LMP 11/01/2021 (Within Weeks) .  There is no height or weight on file to calculate BSA.  Patient appears well. Breathing is normal and nonlabored.     LABS REVIEWED:   Component      Latest Ref Rng 10/15/2024  8:09 AM   WBC      4.0 - 11.0 10e3/uL 5.5    RBC Count      3.80 - 5.20 10e6/uL 4.54    Hemoglobin      11.7 - 15.7 g/dL 13.4    Hematocrit      35.0 - 47.0 % 40.2    MCV      78 - 100 fL 89    MCH      26.5 - 33.0 pg 29.5    MCHC      31.5 - 36.5 g/dL 33.3    RDW      10.0 - 15.0 % 11.9    Platelet Count      150 - 450 10e3/uL 224    % Neutrophils      % 52    % Lymphocytes      % 37    % Monocytes      % 8    % Eosinophils      % 2    % Basophils      % 1    % Immature Granulocytes      % 0    NRBCs per 100 WBC      <1 /100 0    Absolute Neutrophils      1.6 - 8.3 10e3/uL 2.9    Absolute Lymphocytes      0.8 - 5.3 10e3/uL 2.0    Absolute Monocytes      0.0 - 1.3 10e3/uL 0.5    Absolute Eosinophils      0.0 - 0.7 10e3/uL 0.1    Absolute Basophils      0.0 - 0.2 10e3/uL 0.0    Absolute Immature Granulocytes      <=0.4 10e3/uL 0.0    Absolute NRBCs      10e3/uL 0.0    Sodium      135 - 145 mmol/L 138    Potassium      3.4 - 5.3 mmol/L 4.2    Carbon Dioxide (CO2)      22 - 29 mmol/L 25    Anion Gap      7 - 15 mmol/L 11    Urea Nitrogen      8.0 - 23.0 mg/dL 19.6    Creatinine       0.51 - 0.95 mg/dL 0.80    GFR Estimate      >60 mL/min/1.73m2 84    Calcium      8.8 - 10.4 mg/dL 9.3    Chloride      98 - 107 mmol/L 102    Glucose      70 - 99 mg/dL 101 (H)    Alkaline Phosphatase      40 - 150 U/L 71    AST      0 - 45 U/L 22    ALT      0 - 50 U/L 40    Protein Total      6.4 - 8.3 g/dL 7.0    Albumin      3.5 - 5.2 g/dL 4.7    Bilirubin Total      <=1.2 mg/dL 0.4        IMAGING REVIEWED: CT chest, abdomen, pelvis 10/15/2024, was negative for adenopathy or metastases.    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 Multiplanar reformats were  obtained. Dose reduction techniques were used.   CONTRAST: 79mL Isovue-370    COMPARISON: 4/10/2024    FINDINGS:   LUNGS AND PLEURA: A few stable small pulmonary nodules. For example, a  1 mm right upper lobe nodule (series 12, image 79) and left lower lobe  3 mm nodule (12/235) are stable. No new or enlarging nodules. No  infiltrate or pleural effusion.    MEDIASTINUM/AXILLAE: No lymphadenopathy. No thoracic aortic aneurysm.  Mild coronary artery calcifications. No pericardial effusion.    HEPATOBILIARY: Normal.    PANCREAS: Normal.    SPLEEN: Normal.    ADRENAL GLANDS: Normal.    KIDNEYS/BLADDER: Stable subcentimeter left renal cyst requiring no  specific follow-up is otherwise normal kidneys.    BOWEL: No obstruction or inflammatory change.    PELVIC ORGANS: No pelvic masses. Endometrial lining measures 8 mm  (series 8, image 67), previously about 10 mm.    ADDITIONAL FINDINGS: No ascites. No lymphadenopathy. Major  intra-abdominal vasculature is patent.    MUSCULOSKELETAL: No suspicious lesions in the bones.      Impression    IMPRESSION:  1.  No  metastatic disease in the chest, abdomen and pelvis.    EUGENIO LOZANO MD         SYSTEM ID:  [id number]       IMPRESSION/PLAN: Stage I uveal melanoma of the right eye.  Uveal melanoma is a 1.87 mm thick melanoma in the right eye choroid.  Patient underwent definitive 125-I plaque brachytherapy (01/30/2023).  There is no evidence of metastases noted on restaging CT scan (10/15/2024).  Patient will continue close surveillance consisting of clinical evaluation, CT chest, MRI abdomen every 6 months for years 1-5, then annually for years 6-10.  Patient will return to clinic in 6 months with CBC, metabolic panel, CT chest, MRI abdomen. The current and past history obtained from the patient and outside medical records, clinical evaluation, reviewing diagnostic tests and viewing images with the patient, and assessment and planning occurred over 60 minutes.       Niko Pelaez MD    cc: Sherrell Ybarra PA-C      Again, thank you for allowing me to participate in the care of your patient.        Sincerely,        Niko Pelaez MD

## 2024-10-20 ENCOUNTER — HEALTH MAINTENANCE LETTER (OUTPATIENT)
Age: 59
End: 2024-10-20

## 2025-01-26 ENCOUNTER — HEALTH MAINTENANCE LETTER (OUTPATIENT)
Age: 60
End: 2025-01-26

## 2025-04-21 NOTE — PROGRESS NOTES
Virtual Visit Details    Type of service:  Video Visit     Originating Location (pt. 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  Evaluation in ophthalmology clinic 11/01/2022, revealed an elevated choroidal nevus located temporal to the fovea with increased subretinal fluid since the previous examination (08/02/2022).  Patient noted an area of blurred vision nasally in the right eye.  B-scan ultrasound 11/01/2022, revealed an elevated choroidal lesion measuring 5.86 mm(L) x 6.6 mm(W) x 1.87 mm(H).  Previous B-scan ultrasound 08/02/2022, revealed the choroidal lesion measuring 4.37 mm(L) x 4.5 mm (W) x 0.77 mm(H). CT chest, abdomen, pelvis CT chest, abdomen, pelvis 11/14/2022, was negative for adenopathy or metastases. Patient received definitive 125-I plaque brachytherapy to the right eye uveal melanoma (8500 cGy, 9 seeds) on 01/30/2023 with plaque removal 02/02/2023.  Biopsy could not be performed at the time of plaque placement.    Restaging CT chest, abdomen, pelvis 02/06/2023, 09/18/2023, 04/10/2024, 10/15/2024, was negative for adenopathy or metastases. Patient has improvement in the peripheral vision on the lateral right eye. Patient is exercising regularly. There are no other new symptoms or events since the prior clinic visit (10/17/2024). Patient feels well and denies fever, anorexia, weight loss, headache, cough, dyspnea, chest pain, abdominal pain, nausea, vomiting, constipation, diarrhea, bleeding, or bone pain.     PAST HISTORY: Past history was reviewed and unchanged from the clinic note 10/17/2024.     MEDICATIONS:   Current Outpatient Medications    Medication Sig Dispense Refill    atorvastatin (LIPITOR) 20 MG tablet Take 20 mg by mouth daily      escitalopram (LEXAPRO) 10 MG tablet TAKE 1 AND 1/2 TABLETS BY MOUTH DAILY      losartan (COZAAR) 100 MG tablet Take 100 mg by mouth daily.      metFORMIN (GLUCOPHAGE XR) 500 MG 24 hr tablet Take 2,000 mg by mouth daily (with breakfast)      metoprolol succinate ER (TOPROL XL) 50 MG 24 hr tablet Take 50 mg by mouth daily      MOUNJARO 2.5 MG/0.5ML pen ADMINISTER 2.5MG UNDER THE SKIN 1 TIME A WEEK         REVIEW OF SYSTEMS: Review of systems reviewed with the patient and otherwise negative except for those detailed above.    PHYSICAL EXAM: Not done. Telehealth visit. LMP 11/01/2021 (Within Weeks) . Patient appears well. Breathing is normal and nonlabored.     LABS REVIEWED:   Component      Latest Ref Rng 4/23/2025  8:15 AM   WBC      4.0 - 11.0 10e3/uL 5.7    RBC Count      3.80 - 5.20 10e6/uL 4.42    Hemoglobin      11.7 - 15.7 g/dL 13.2    Hematocrit      35.0 - 47.0 % 38.8    MCV      78 - 100 fL 88    MCH      26.5 - 33.0 pg 29.9    MCHC      31.5 - 36.5 g/dL 34.0    RDW      10.0 - 15.0 % 12.3    Platelet Count      150 - 450 10e3/uL 182    % Neutrophils      % 55    % Lymphocytes      % 37    % Monocytes      % 7    % Eosinophils      % 1    % Basophils      % 0    % Immature Granulocytes      % 0    NRBC/W      <1 /100 0    Absolute Neutrophil      1.6 - 8.3 10e3/uL 3.1    Absolute Lymphocytes      0.8 - 5.3 10e3/uL 2.1    Absolute Monocytes      0.0 - 1.3 10e3/uL 0.4    Absolute Eosinophils      0.0 - 0.7 10e3/uL 0.1    Absolute Basophils      0.0 - 0.2 10e3/uL 0.0    Absolute Immature Granulocytes      <=0.4 10e3/uL 0.0    Absolute NRBCs      10e3/uL 0.0    Sodium      135 - 145 mmol/L 137    Potassium      3.4 - 5.3 mmol/L 4.7    Carbon Dioxide (CO2)      22 - 29 mmol/L 24    Anion Gap      7 - 15 mmol/L 10    Urea Nitrogen      8.0 - 23.0 mg/dL 21.5    Creatinine      0.51 - 0.95 mg/dL 0.76    GFR  Estimate      >60 mL/min/1.73m2 90    Calcium      8.8 - 10.4 mg/dL 9.5    Chloride      98 - 107 mmol/L 103    Glucose      70 - 99 mg/dL 90    Alkaline Phosphatase      40 - 150 U/L 70    AST      0 - 45 U/L 26    ALT      0 - 50 U/L 57 (H)    Protein Total      6.4 - 8.3 g/dL 6.6    Albumin      3.5 - 5.2 g/dL 4.3    Bilirubin Total      <=1.2 mg/dL 0.4        IMAGING REVIEWED: CT chest 04/23/2025, was negative for adenopathy or metastases.  MRI abdomen 04/23/2025, was negative for metastases.    Recent Results (from the past 744 hours)   CT Chest w/o Contrast    Narrative    EXAM: CT CHEST W/O CONTRAST  LOCATION: [facility]  DATE: 4/23/2025    INDICATION: Stage I uveal melanoma, right eye. CT chest and MRI abdomen requested to evaluate for metastases.  COMPARISON: 10/15/2024  TECHNIQUE: CT chest without IV contrast. Multiplanar reformats were obtained. Dose reduction techniques were used.  CONTRAST: None.    FINDINGS:   LUNGS AND PLEURA: Patent airways. Stable right upper lobe micronodule (series 4, image 73) and 3-4 mm left lower lobe nodule (image 226). No new or enlarging pulmonary nodules, acute airspace opacity, or pleural effusion.    MEDIASTINUM/AXILLAE: Normal.    CORONARY ARTERY CALCIFICATION: Trace.    UPPER ABDOMEN: No actionable finding.    MUSCULOSKELETAL: No aggressive osseous lesion.      Impression    IMPRESSION:   1.  No evidence of thoracic metastatic disease.  2.  Stable couple of small pulmonary nodules.           MR Abdomen w/o & w Contrast    Narrative    EXAM: MR ABDOMEN W/O and W CONTRAST  LOCATION: [facility]  DATE: 4/23/2025    INDICATION: Stage I uveal melanoma, right eye. CT chest and MRI abdomen requested to evaluate for metastases.  COMPARISON: 10/15/2024.  TECHNIQUE: Routine MRI abdomen protocol including T1 in/out phase, diffusion, multiplane T2, and dynamic T1 without and with IV contrast.   CONTRAST: 7 mL Gadavist.    FINDINGS:      LIVER: No significant hepatic abnormality. No focal masses. No evidence of cirrhosis or significant fat or iron deposition.    PANCREAS: No evidence of mass or pancreatitis.    ADDITIONAL FINDINGS: Tiny left upper pole renal cortical cyst. No follow-up is necessary. No significant abnormality in the spleen, right and adrenal glands. No adenopathy. No ascites.      Impression    IMPRESSION:  1.  No evidence of metastatic disease.         IMPRESSION/PLAN: Stage I uveal melanoma of the right eye.  Uveal melanoma is a 1.87 mm thick melanoma in the right eye choroid.  Patient underwent definitive 125-I plaque brachytherapy (01/30/2023).  There is no evidence of metastases noted on restaging CT chest, MRI abdomen (04/23/2025).  Patient will continue close surveillance consisting of clinical evaluation, CT chest, MRI abdomen every 6 months for years 1-5, then annually for years 6-10.  Patient will return to clinic in 6 months with CBC, metabolic panel, CT chest, MRI abdomen. The current and past history, clinical evaluation, reviewing diagnostic tests and imaging with the patient, assessment, complex management of high risk uveal melanoma, and planning occurred over 30 minutes.       Niko Pelaez MD    cc: SANKET Walker MD

## 2025-04-23 ENCOUNTER — LAB (OUTPATIENT)
Dept: LAB | Facility: CLINIC | Age: 60
End: 2025-04-23
Attending: INTERNAL MEDICINE
Payer: COMMERCIAL

## 2025-04-23 ENCOUNTER — HOSPITAL ENCOUNTER (OUTPATIENT)
Dept: MRI IMAGING | Facility: CLINIC | Age: 60
Discharge: HOME | End: 2025-04-23
Attending: INTERNAL MEDICINE
Payer: COMMERCIAL

## 2025-04-23 ENCOUNTER — HOSPITAL ENCOUNTER (OUTPATIENT)
Dept: CT IMAGING | Facility: CLINIC | Age: 60
Discharge: HOME | End: 2025-04-23
Attending: INTERNAL MEDICINE
Payer: COMMERCIAL

## 2025-04-23 DIAGNOSIS — C69.41 MALIGNANT MELANOMA OF UVEA OF RIGHT EYE (H): ICD-10-CM

## 2025-04-23 LAB
ALBUMIN SERPL BCG-MCNC: 4.3 G/DL (ref 3.5–5.2)
ALP SERPL-CCNC: 70 U/L (ref 40–150)
ALT SERPL W P-5'-P-CCNC: 57 U/L (ref 0–50)
ANION GAP SERPL CALCULATED.3IONS-SCNC: 10 MMOL/L (ref 7–15)
AST SERPL W P-5'-P-CCNC: 26 U/L (ref 0–45)
BASOPHILS # BLD AUTO: 0 10E3/UL (ref 0–0.2)
BASOPHILS NFR BLD AUTO: 0 %
BILIRUB SERPL-MCNC: 0.4 MG/DL
BUN SERPL-MCNC: 21.5 MG/DL (ref 8–23)
CALCIUM SERPL-MCNC: 9.5 MG/DL (ref 8.8–10.4)
CHLORIDE SERPL-SCNC: 103 MMOL/L (ref 98–107)
CREAT SERPL-MCNC: 0.76 MG/DL (ref 0.51–0.95)
EGFRCR SERPLBLD CKD-EPI 2021: 90 ML/MIN/1.73M2
EOSINOPHIL # BLD AUTO: 0.1 10E3/UL (ref 0–0.7)
EOSINOPHIL NFR BLD AUTO: 1 %
ERYTHROCYTE [DISTWIDTH] IN BLOOD BY AUTOMATED COUNT: 12.3 % (ref 10–15)
GLUCOSE SERPL-MCNC: 90 MG/DL (ref 70–99)
HCO3 SERPL-SCNC: 24 MMOL/L (ref 22–29)
HCT VFR BLD AUTO: 38.8 % (ref 35–47)
HGB BLD-MCNC: 13.2 G/DL (ref 11.7–15.7)
IMM GRANULOCYTES # BLD: 0 10E3/UL
IMM GRANULOCYTES NFR BLD: 0 %
LYMPHOCYTES # BLD AUTO: 2.1 10E3/UL (ref 0.8–5.3)
LYMPHOCYTES NFR BLD AUTO: 37 %
MCH RBC QN AUTO: 29.9 PG (ref 26.5–33)
MCHC RBC AUTO-ENTMCNC: 34 G/DL (ref 31.5–36.5)
MCV RBC AUTO: 88 FL (ref 78–100)
MONOCYTES # BLD AUTO: 0.4 10E3/UL (ref 0–1.3)
MONOCYTES NFR BLD AUTO: 7 %
NEUTROPHILS # BLD AUTO: 3.1 10E3/UL (ref 1.6–8.3)
NEUTROPHILS NFR BLD AUTO: 55 %
NRBC # BLD AUTO: 0 10E3/UL
NRBC BLD AUTO-RTO: 0 /100
PLATELET # BLD AUTO: 182 10E3/UL (ref 150–450)
POTASSIUM SERPL-SCNC: 4.7 MMOL/L (ref 3.4–5.3)
PROT SERPL-MCNC: 6.6 G/DL (ref 6.4–8.3)
RBC # BLD AUTO: 4.42 10E6/UL (ref 3.8–5.2)
SODIUM SERPL-SCNC: 137 MMOL/L (ref 135–145)
WBC # BLD AUTO: 5.7 10E3/UL (ref 4–11)

## 2025-04-23 PROCEDURE — 82310 ASSAY OF CALCIUM: CPT

## 2025-04-23 PROCEDURE — 71250 CT THORAX DX C-: CPT

## 2025-04-23 PROCEDURE — A9585 GADOBUTROL INJECTION: HCPCS | Performed by: INTERNAL MEDICINE

## 2025-04-23 PROCEDURE — 255N000002 HC RX 255 OP 636: Performed by: INTERNAL MEDICINE

## 2025-04-23 PROCEDURE — 82947 ASSAY GLUCOSE BLOOD QUANT: CPT

## 2025-04-23 PROCEDURE — 74183 MRI ABD W/O CNTR FLWD CNTR: CPT

## 2025-04-23 PROCEDURE — 36415 COLL VENOUS BLD VENIPUNCTURE: CPT

## 2025-04-23 PROCEDURE — 85004 AUTOMATED DIFF WBC COUNT: CPT

## 2025-04-23 RX ORDER — GADOBUTROL 604.72 MG/ML
7 INJECTION INTRAVENOUS ONCE
Status: COMPLETED | OUTPATIENT
Start: 2025-04-23 | End: 2025-04-23

## 2025-04-23 RX ADMIN — GADOBUTROL 7 ML: 604.72 INJECTION INTRAVENOUS at 09:35

## 2025-04-28 ENCOUNTER — VIRTUAL VISIT (OUTPATIENT)
Dept: ONCOLOGY | Facility: CLINIC | Age: 60
End: 2025-04-28
Attending: INTERNAL MEDICINE
Payer: COMMERCIAL

## 2025-04-28 VITALS — WEIGHT: 152 LBS | HEIGHT: 64 IN | BODY MASS INDEX: 25.95 KG/M2

## 2025-04-28 DIAGNOSIS — C69.41 MALIGNANT MELANOMA OF UVEA OF RIGHT EYE (H): Primary | ICD-10-CM

## 2025-04-28 PROCEDURE — 1126F AMNT PAIN NOTED NONE PRSNT: CPT | Performed by: INTERNAL MEDICINE

## 2025-04-28 PROCEDURE — 98006 SYNCH AUDIO-VIDEO EST MOD 30: CPT | Performed by: INTERNAL MEDICINE

## 2025-04-28 ASSESSMENT — PAIN SCALES - GENERAL: PAINLEVEL_OUTOF10: NO PAIN (0)

## 2025-04-28 NOTE — NURSING NOTE
Patient reviewed medications and allergies in [facility] [employer] during e-check in and said everything looked correct.      Current patient location: [address] [place] [id number]    Is the patient currently in the state of [place]? YES    Visit mode: VIDEO    If the visit is dropped, the patient can be reconnected by:VIDEO VISIT: Text to cell phone:   Telephone Information:   Mobile [phone]    and VIDEO VISIT: Send to e-mail at: [email or web address]    Will anyone else be joining the visit? NO  (If patient encounters technical issues they should call [phone])    Are changes needed to the allergy or medication list? Pt declined med review and Pt stated no med changes    Are refills needed on medications prescribed by this physician? NO    Rooming Documentation:  Questionnaire(s) completed    Reason for visit: MONI WILKINSONF

## 2025-04-28 NOTE — LETTER
4/28/2025      Cheyenne Caldwell  [address] [facility] [address]      Dear Colleague,    Thank you for referring your patient, Cheyenne Caldwell, to the [facility]. Please see a copy of my visit note below.    Virtual Visit Details    Type of service:  Video Visit     Originating Location (pt. 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  Evaluation in ophthalmology clinic 11/01/2022, revealed an elevated choroidal nevus located temporal to the fovea with increased subretinal fluid since the previous examination (08/02/2022).  Patient noted an area of blurred vision nasally in the right eye.  B-scan ultrasound 11/01/2022, revealed an elevated choroidal lesion measuring 5.86 mm(L) x 6.6 mm(W) x 1.87 mm(H).  Previous B-scan ultrasound 08/02/2022, revealed the choroidal lesion measuring 4.37 mm(L) x 4.5 mm (W) x 0.77 mm(H). CT chest, abdomen, pelvis CT chest, abdomen, pelvis 11/14/2022, was negative for adenopathy or metastases. Patient received definitive 125-I plaque brachytherapy to the right eye uveal melanoma (8500 cGy, 9 seeds) on 01/30/2023 with plaque removal 02/02/2023.  Biopsy could not be performed at the time of plaque placement.    Restaging CT chest, abdomen, pelvis 02/06/2023, 09/18/2023, 04/10/2024, 10/15/2024, was negative for adenopathy or metastases. Patient has improvement in the peripheral vision on the lateral right eye. Patient is exercising regularly. There are no other new symptoms or events since the prior clinic visit (10/17/2024). Patient feels well and denies fever, anorexia, weight loss, headache, cough,  dyspnea, chest pain, abdominal pain, nausea, vomiting, constipation, diarrhea, bleeding, or bone pain.     PAST HISTORY: Past history was reviewed and unchanged from the clinic note 10/17/2024.     MEDICATIONS:   Current Outpatient Medications   Medication Sig Dispense Refill     atorvastatin (LIPITOR) 20 MG tablet Take 20 mg by mouth daily       escitalopram (LEXAPRO) 10 MG tablet TAKE 1 AND 1/2 TABLETS BY MOUTH DAILY       losartan (COZAAR) 100 MG tablet Take 100 mg by mouth daily.       metFORMIN (GLUCOPHAGE XR) 500 MG 24 hr tablet Take 2,000 mg by mouth daily (with breakfast)       metoprolol succinate ER (TOPROL XL) 50 MG 24 hr tablet Take 50 mg by mouth daily       MOUNJARO 2.5 MG/0.5ML pen ADMINISTER 2.5MG UNDER THE SKIN 1 TIME A WEEK         REVIEW OF SYSTEMS: Review of systems reviewed with the patient and otherwise negative except for those detailed above.    PHYSICAL EXAM: Not done. Telehealth visit. LMP 11/01/2021 (Within Weeks) . Patient appears well. Breathing is normal and nonlabored.     LABS REVIEWED:   Component      Latest Ref Rng 4/23/2025  8:15 AM   WBC      4.0 - 11.0 10e3/uL 5.7    RBC Count      3.80 - 5.20 10e6/uL 4.42    Hemoglobin      11.7 - 15.7 g/dL 13.2    Hematocrit      35.0 - 47.0 % 38.8    MCV      78 - 100 fL 88    MCH      26.5 - 33.0 pg 29.9    MCHC      31.5 - 36.5 g/dL 34.0    RDW      10.0 - 15.0 % 12.3    Platelet Count      150 - 450 10e3/uL 182    % Neutrophils      % 55    % Lymphocytes      % 37    % Monocytes      % 7    % Eosinophils      % 1    % Basophils      % 0    % Immature Granulocytes      % 0    NRBC/W      <1 /100 0    Absolute Neutrophil      1.6 - 8.3 10e3/uL 3.1    Absolute Lymphocytes      0.8 - 5.3 10e3/uL 2.1    Absolute Monocytes      0.0 - 1.3 10e3/uL 0.4    Absolute Eosinophils      0.0 - 0.7 10e3/uL 0.1    Absolute Basophils      0.0 - 0.2 10e3/uL 0.0    Absolute Immature Granulocytes      <=0.4 10e3/uL 0.0    Absolute NRBCs      10e3/uL 0.0     Sodium      135 - 145 mmol/L 137    Potassium      3.4 - 5.3 mmol/L 4.7    Carbon Dioxide (CO2)      22 - 29 mmol/L 24    Anion Gap      7 - 15 mmol/L 10    Urea Nitrogen      8.0 - 23.0 mg/dL 21.5    Creatinine      0.51 - 0.95 mg/dL 0.76    GFR Estimate      >60 mL/min/1.73m2 90    Calcium      8.8 - 10.4 mg/dL 9.5    Chloride      98 - 107 mmol/L 103    Glucose      70 - 99 mg/dL 90    Alkaline Phosphatase      40 - 150 U/L 70    AST      0 - 45 U/L 26    ALT      0 - 50 U/L 57 (H)    Protein Total      6.4 - 8.3 g/dL 6.6    Albumin      3.5 - 5.2 g/dL 4.3    Bilirubin Total      <=1.2 mg/dL 0.4        IMAGING REVIEWED: CT chest 04/23/2025, was negative for adenopathy or metastases.  MRI abdomen 04/23/2025, was negative for metastases.    Recent Results (from the past 744 hours)   CT Chest w/o Contrast    Narrative    EXAM: CT CHEST W/O CONTRAST  LOCATION: [facility]  DATE: 4/23/2025    INDICATION: Stage I uveal melanoma, right eye. CT chest and MRI abdomen requested to evaluate for metastases.  COMPARISON: 10/15/2024  TECHNIQUE: CT chest without IV contrast. Multiplanar reformats were obtained. Dose reduction techniques were used.  CONTRAST: None.    FINDINGS:   LUNGS AND PLEURA: Patent airways. Stable right upper lobe micronodule (series 4, image 73) and 3-4 mm left lower lobe nodule (image 226). No new or enlarging pulmonary nodules, acute airspace opacity, or pleural effusion.    MEDIASTINUM/AXILLAE: Normal.    CORONARY ARTERY CALCIFICATION: Trace.    UPPER ABDOMEN: No actionable finding.    MUSCULOSKELETAL: No aggressive osseous lesion.      Impression    IMPRESSION:   1.  No evidence of thoracic metastatic disease.  2.  Stable couple of small pulmonary nodules.           MR Abdomen w/o & w Contrast    Narrative    EXAM: MR ABDOMEN W/O and W CONTRAST  LOCATION: [facility]  DATE: 4/23/2025    INDICATION: Stage I uveal melanoma, right eye. CT chest and MRI  abdomen requested to evaluate for metastases.  COMPARISON: 10/15/2024.  TECHNIQUE: Routine MRI abdomen protocol including T1 in/out phase, diffusion, multiplane T2, and dynamic T1 without and with IV contrast.   CONTRAST: 7 mL Gadavist.    FINDINGS:     LIVER: No significant hepatic abnormality. No focal masses. No evidence of cirrhosis or significant fat or iron deposition.    PANCREAS: No evidence of mass or pancreatitis.    ADDITIONAL FINDINGS: Tiny left upper pole renal cortical cyst. No follow-up is necessary. No significant abnormality in the spleen, right and adrenal glands. No adenopathy. No ascites.      Impression    IMPRESSION:  1.  No evidence of metastatic disease.         IMPRESSION/PLAN: Stage I uveal melanoma of the right eye.  Uveal melanoma is a 1.87 mm thick melanoma in the right eye choroid.  Patient underwent definitive 125-I plaque brachytherapy (01/30/2023).  There is no evidence of metastases noted on restaging CT chest, MRI abdomen (04/23/2025).  Patient will continue close surveillance consisting of clinical evaluation, CT chest, MRI abdomen every 6 months for years 1-5, then annually for years 6-10.  Patient will return to clinic in 6 months with CBC, metabolic panel, CT chest, MRI abdomen. The current and past history, clinical evaluation, reviewing diagnostic tests and imaging with the patient, assessment, complex management of high risk uveal melanoma, and planning occurred over 30 minutes.       Niko Pelaez MD    cc: SANKET Walker MD       Again, thank you for allowing me to participate in the care of your patient.        Sincerely,        Niko Pelaez MD    Electronically signed

## 2025-05-04 ENCOUNTER — HEALTH MAINTENANCE LETTER (OUTPATIENT)
Age: 60
End: 2025-05-04

## 2025-08-16 ENCOUNTER — HEALTH MAINTENANCE LETTER (OUTPATIENT)
Age: 60
End: 2025-08-16

## (undated) DEVICE — DRAPE STERI TOWEL LG 1010

## (undated) DEVICE — STRAP KNEE/BODY 31143004

## (undated) DEVICE — EYE DRAPE MICROSCOPE UNIVERSAL (BIOM FULL) 08-MK55140

## (undated) DEVICE — PACK CATARACT UMMC

## (undated) DEVICE — GLOVE BIOGEL PI MICRO SZ 7.0 48570

## (undated) DEVICE — LINEN TOWEL PACK X5 5464

## (undated) DEVICE — POSITIONER ARMBOARD FOAM 1PAIR LF FP-ARMB1

## (undated) DEVICE — ATTENTION CASE CART PLEASE PICK

## (undated) DEVICE — SU VICRYL 7-0 TG140-8DA 18" J546G

## (undated) DEVICE — EYE PREP BETADINE 5% SOLUTION 30ML 0065-0411-30

## (undated) DEVICE — DRSG EYE PAD STERILE 1.63X2.63" NON21600

## (undated) DEVICE — LIGHT HANDLE X2

## (undated) DEVICE — COVER CAMERA IN-LIGHT DISP LT-C02

## (undated) DEVICE — NDL ANGIOCATH 14GA 1.25" 4048

## (undated) DEVICE — APPLICATORS COTTON TIP 6"X2 STERILE LF C15053-006

## (undated) DEVICE — SU PLAIN 6-0 G-1DA 18" 770G

## (undated) DEVICE — GLOVE BIOGEL PI MICRO SZ 7.5 48575

## (undated) DEVICE — SYR 01ML LL W/O NDL LATEX FREE 309628

## (undated) DEVICE — APPLICATORS COTTON-TIPPED 3" PKG OF 2 C15050-003

## (undated) DEVICE — SU SILK 2-0 TIE 12X30" A305H

## (undated) DEVICE — COVER ULTRASOUND PROBE W/GEL FLEXI-FEEL 6"X58" LF  25-FF658

## (undated) DEVICE — PEN MARKING SKIN VISIMARK 1424SR

## (undated) DEVICE — SU MERSILENE 5-0 S-14 DA 18" WHITE 1760G

## (undated) DEVICE — EYE SHIELD PLASTIC CLEAR UNIVERSAL K9-6050

## (undated) DEVICE — SU VICRYL 6-0 S-29 12" J556G

## (undated) DEVICE — DRSG STERI STRIP 1/2X4" R1547

## (undated) DEVICE — SYR 10ML LL W/O NDL 302995

## (undated) DEVICE — SYR 05ML LL W/O NDL

## (undated) DEVICE — Device

## (undated) DEVICE — NDL 30GA 0.5" 305106

## (undated) DEVICE — NDL 25GA 1.5" 305127

## (undated) DEVICE — PACK VITRECTOMY/RET CUSTOM ASC PQ15VRU12

## (undated) DEVICE — DRAPE MICROSCOPE OPMI ZEISS 48X118" 306071-0000-000

## (undated) DEVICE — EYE NDL RETROBULBAR ATKINSON 25GA 1.5" 581637

## (undated) DEVICE — SPONGE SPEAR WECK CEL 6/PKG 0008680

## (undated) DEVICE — SPECIMEN CONTAINER 60MLW/10% FORMALIN 59601

## (undated) DEVICE — STERI-DRAPE

## (undated) DEVICE — NDL 18GA 1.5" 305196

## (undated) RX ORDER — TROPICAMIDE 5 MG/ML
SOLUTION/ DROPS OPHTHALMIC
Status: DISPENSED
Start: 2023-01-30

## (undated) RX ORDER — EPHEDRINE SULFATE 50 MG/ML
INJECTION, SOLUTION INTRAMUSCULAR; INTRAVENOUS; SUBCUTANEOUS
Status: DISPENSED
Start: 2023-01-30

## (undated) RX ORDER — FENTANYL CITRATE-0.9 % NACL/PF 10 MCG/ML
PLASTIC BAG, INJECTION (ML) INTRAVENOUS
Status: DISPENSED
Start: 2023-01-30

## (undated) RX ORDER — KETOROLAC TROMETHAMINE 30 MG/ML
INJECTION, SOLUTION INTRAMUSCULAR; INTRAVENOUS
Status: DISPENSED
Start: 2023-02-02

## (undated) RX ORDER — GLYCOPYRROLATE 0.2 MG/ML
INJECTION INTRAMUSCULAR; INTRAVENOUS
Status: DISPENSED
Start: 2023-01-30

## (undated) RX ORDER — KETOROLAC TROMETHAMINE 30 MG/ML
INJECTION, SOLUTION INTRAMUSCULAR; INTRAVENOUS
Status: DISPENSED
Start: 2023-01-30

## (undated) RX ORDER — FENTANYL CITRATE 50 UG/ML
INJECTION, SOLUTION INTRAMUSCULAR; INTRAVENOUS
Status: DISPENSED
Start: 2023-02-02

## (undated) RX ORDER — CYCLOPENTOLAT/TROPIC/PHENYLEPH 1%-1%-2.5%
DROPS (EA) OPHTHALMIC (EYE)
Status: DISPENSED
Start: 2023-01-30

## (undated) RX ORDER — CYCLOPENTOLAT/TROPIC/PHENYLEPH 1%-1%-2.5%
DROPS (EA) OPHTHALMIC (EYE)
Status: DISPENSED
Start: 2023-02-02

## (undated) RX ORDER — ONDANSETRON 2 MG/ML
INJECTION INTRAMUSCULAR; INTRAVENOUS
Status: DISPENSED
Start: 2023-01-30

## (undated) RX ORDER — DEXAMETHASONE SODIUM PHOSPHATE 4 MG/ML
INJECTION, SOLUTION INTRA-ARTICULAR; INTRALESIONAL; INTRAMUSCULAR; INTRAVENOUS; SOFT TISSUE
Status: DISPENSED
Start: 2023-01-30

## (undated) RX ORDER — PROPARACAINE HYDROCHLORIDE 5 MG/ML
SOLUTION/ DROPS OPHTHALMIC
Status: DISPENSED
Start: 2023-01-30

## (undated) RX ORDER — PHENYLEPHRINE HYDROCHLORIDE 25 MG/ML
SOLUTION/ DROPS OPHTHALMIC
Status: DISPENSED
Start: 2023-01-30

## (undated) RX ORDER — PROPOFOL 10 MG/ML
INJECTION, EMULSION INTRAVENOUS
Status: DISPENSED
Start: 2023-01-30

## (undated) RX ORDER — ATROPINE SULFATE 10 MG/ML
SOLUTION/ DROPS OPHTHALMIC
Status: DISPENSED
Start: 2023-01-30

## (undated) RX ORDER — EPHEDRINE SULFATE 50 MG/ML
INJECTION, SOLUTION INTRAVENOUS
Status: DISPENSED
Start: 2023-01-30

## (undated) RX ORDER — TROPICAMIDE 10 MG/ML
SOLUTION/ DROPS OPHTHALMIC
Status: DISPENSED
Start: 2023-01-30

## (undated) RX ORDER — FENTANYL CITRATE 50 UG/ML
INJECTION, SOLUTION INTRAMUSCULAR; INTRAVENOUS
Status: DISPENSED
Start: 2023-01-30